# Patient Record
Sex: MALE | Race: WHITE | NOT HISPANIC OR LATINO | Employment: OTHER | URBAN - METROPOLITAN AREA
[De-identification: names, ages, dates, MRNs, and addresses within clinical notes are randomized per-mention and may not be internally consistent; named-entity substitution may affect disease eponyms.]

---

## 2017-04-26 ENCOUNTER — TRANSCRIBE ORDERS (OUTPATIENT)
Dept: ADMINISTRATIVE | Facility: HOSPITAL | Age: 72
End: 2017-04-26

## 2017-04-26 DIAGNOSIS — Z87.891 FORMER SMOKER: Primary | ICD-10-CM

## 2017-05-03 ENCOUNTER — HOSPITAL ENCOUNTER (OUTPATIENT)
Dept: CT IMAGING | Facility: HOSPITAL | Age: 72
Discharge: HOME | End: 2017-05-03
Admitting: PHYSICIAN ASSISTANT

## 2017-05-03 DIAGNOSIS — Z87.891 FORMER SMOKER: ICD-10-CM

## 2017-05-03 PROCEDURE — G0297 LDCT FOR LUNG CA SCREEN: HCPCS

## 2018-06-13 ENCOUNTER — TRANSCRIBE ORDERS (OUTPATIENT)
Dept: ULTRASOUND IMAGING | Facility: HOSPITAL | Age: 73
End: 2018-06-13

## 2018-06-13 DIAGNOSIS — G89.29 CHRONIC LOW BACK PAIN WITHOUT SCIATICA, UNSPECIFIED BACK PAIN LATERALITY: Primary | ICD-10-CM

## 2018-06-13 DIAGNOSIS — M54.50 CHRONIC LOW BACK PAIN WITHOUT SCIATICA, UNSPECIFIED BACK PAIN LATERALITY: Primary | ICD-10-CM

## 2018-06-14 ENCOUNTER — APPOINTMENT (OUTPATIENT)
Dept: CT IMAGING | Facility: HOSPITAL | Age: 73
End: 2018-06-14

## 2018-06-14 ENCOUNTER — HOSPITAL ENCOUNTER (EMERGENCY)
Facility: HOSPITAL | Age: 73
Discharge: HOME | End: 2018-06-14
Attending: EMERGENCY MEDICINE | Admitting: EMERGENCY MEDICINE

## 2018-06-14 VITALS
RESPIRATION RATE: 16 BRPM | SYSTOLIC BLOOD PRESSURE: 133 MMHG | TEMPERATURE: 97.8 F | HEART RATE: 59 BPM | DIASTOLIC BLOOD PRESSURE: 78 MMHG | OXYGEN SATURATION: 92 % | HEIGHT: 73 IN

## 2018-06-14 DIAGNOSIS — M54.50 ACUTE RIGHT-SIDED LOW BACK PAIN WITHOUT SCIATICA: Primary | ICD-10-CM

## 2018-06-14 LAB
ALBUMIN SERPL-MCNC: 4.5 G/DL (ref 3.5–5)
ALBUMIN/GLOB SERPL: 1.4 G/DL (ref 1–2)
ALP SERPL-CCNC: 86 U/L (ref 38–126)
ALT SERPL W P-5'-P-CCNC: 32 U/L (ref 13–69)
ANION GAP SERPL CALCULATED.3IONS-SCNC: 14.1 MMOL/L (ref 10–20)
AST SERPL-CCNC: 38 U/L (ref 15–46)
BASOPHILS # BLD AUTO: 0.03 10*3/MM3 (ref 0–0.2)
BASOPHILS NFR BLD AUTO: 0.4 % (ref 0–2.5)
BILIRUB SERPL-MCNC: 0.5 MG/DL (ref 0.2–1.3)
BILIRUB UR QL STRIP: NEGATIVE
BUN BLD-MCNC: 14 MG/DL (ref 7–20)
BUN/CREAT SERPL: 12.7 (ref 6.3–21.9)
CALCIUM SPEC-SCNC: 9.4 MG/DL (ref 8.4–10.2)
CHLORIDE SERPL-SCNC: 101 MMOL/L (ref 98–107)
CLARITY UR: CLEAR
CO2 SERPL-SCNC: 28 MMOL/L (ref 26–30)
COLOR UR: NORMAL
CREAT BLD-MCNC: 1.1 MG/DL (ref 0.6–1.3)
DEPRECATED RDW RBC AUTO: 42.5 FL (ref 37–54)
EOSINOPHIL # BLD AUTO: 0.09 10*3/MM3 (ref 0–0.7)
EOSINOPHIL NFR BLD AUTO: 1.3 % (ref 0–7)
ERYTHROCYTE [DISTWIDTH] IN BLOOD BY AUTOMATED COUNT: 12.3 % (ref 11.5–14.5)
GFR SERPL CREATININE-BSD FRML MDRD: 66 ML/MIN/1.73
GLOBULIN UR ELPH-MCNC: 3.3 GM/DL
GLUCOSE BLD-MCNC: 105 MG/DL (ref 74–98)
GLUCOSE UR STRIP-MCNC: NEGATIVE MG/DL
HCT VFR BLD AUTO: 43.5 % (ref 42–52)
HGB BLD-MCNC: 14.8 G/DL (ref 14–18)
HGB UR QL STRIP.AUTO: NEGATIVE
IMM GRANULOCYTES # BLD: 0.02 10*3/MM3 (ref 0–0.06)
IMM GRANULOCYTES NFR BLD: 0.3 % (ref 0–0.6)
KETONES UR QL STRIP: NEGATIVE
LEUKOCYTE ESTERASE UR QL STRIP.AUTO: NEGATIVE
LYMPHOCYTES # BLD AUTO: 1.83 10*3/MM3 (ref 0.6–3.4)
LYMPHOCYTES NFR BLD AUTO: 26 % (ref 10–50)
MCH RBC QN AUTO: 32.5 PG (ref 27–31)
MCHC RBC AUTO-ENTMCNC: 34 G/DL (ref 30–37)
MCV RBC AUTO: 95.6 FL (ref 80–94)
MONOCYTES # BLD AUTO: 0.53 10*3/MM3 (ref 0–0.9)
MONOCYTES NFR BLD AUTO: 7.5 % (ref 0–12)
NEUTROPHILS # BLD AUTO: 4.55 10*3/MM3 (ref 2–6.9)
NEUTROPHILS NFR BLD AUTO: 64.5 % (ref 37–80)
NITRITE UR QL STRIP: NEGATIVE
NRBC BLD MANUAL-RTO: 0 /100 WBC (ref 0–0)
PH UR STRIP.AUTO: 6 [PH] (ref 5–8)
PLATELET # BLD AUTO: 161 10*3/MM3 (ref 130–400)
PMV BLD AUTO: 9.8 FL (ref 6–12)
POTASSIUM BLD-SCNC: 4.1 MMOL/L (ref 3.5–5.1)
PROT SERPL-MCNC: 7.8 G/DL (ref 6.3–8.2)
PROT UR QL STRIP: NEGATIVE
RBC # BLD AUTO: 4.55 10*6/MM3 (ref 4.7–6.1)
SODIUM BLD-SCNC: 139 MMOL/L (ref 137–145)
SP GR UR STRIP: <=1.005 (ref 1–1.03)
UROBILINOGEN UR QL STRIP: NORMAL
WBC NRBC COR # BLD: 7.05 10*3/MM3 (ref 4.8–10.8)

## 2018-06-14 PROCEDURE — 25010000002 IOPAMIDOL 61 % SOLUTION: Performed by: EMERGENCY MEDICINE

## 2018-06-14 PROCEDURE — 81003 URINALYSIS AUTO W/O SCOPE: CPT | Performed by: PHYSICIAN ASSISTANT

## 2018-06-14 PROCEDURE — 85025 COMPLETE CBC W/AUTO DIFF WBC: CPT | Performed by: PHYSICIAN ASSISTANT

## 2018-06-14 PROCEDURE — 99284 EMERGENCY DEPT VISIT MOD MDM: CPT

## 2018-06-14 PROCEDURE — 74177 CT ABD & PELVIS W/CONTRAST: CPT

## 2018-06-14 PROCEDURE — 80053 COMPREHEN METABOLIC PANEL: CPT | Performed by: PHYSICIAN ASSISTANT

## 2018-06-14 PROCEDURE — 96360 HYDRATION IV INFUSION INIT: CPT

## 2018-06-14 RX ORDER — NAPROXEN 500 MG/1
500 TABLET ORAL ONCE
Status: COMPLETED | OUTPATIENT
Start: 2018-06-14 | End: 2018-06-14

## 2018-06-14 RX ORDER — NAPROXEN 250 MG/1
250 TABLET ORAL 2 TIMES DAILY PRN
Qty: 14 TABLET | Refills: 0 | Status: SHIPPED | OUTPATIENT
Start: 2018-06-14 | End: 2022-01-07

## 2018-06-14 RX ADMIN — IOPAMIDOL 100 ML: 612 INJECTION, SOLUTION INTRAVENOUS at 20:41

## 2018-06-14 RX ADMIN — NAPROXEN 500 MG: 500 TABLET ORAL at 21:58

## 2018-06-14 RX ADMIN — SODIUM CHLORIDE 1000 ML: 9 INJECTION, SOLUTION INTRAVENOUS at 20:05

## 2018-06-15 ENCOUNTER — HOSPITAL ENCOUNTER (OUTPATIENT)
Dept: ULTRASOUND IMAGING | Facility: HOSPITAL | Age: 73
Discharge: HOME | End: 2018-06-15
Admitting: INTERNAL MEDICINE

## 2018-06-15 DIAGNOSIS — M54.50 CHRONIC LOW BACK PAIN WITHOUT SCIATICA, UNSPECIFIED BACK PAIN LATERALITY: ICD-10-CM

## 2018-06-15 DIAGNOSIS — G89.29 CHRONIC LOW BACK PAIN WITHOUT SCIATICA, UNSPECIFIED BACK PAIN LATERALITY: ICD-10-CM

## 2018-06-15 PROCEDURE — 76775 US EXAM ABDO BACK WALL LIM: CPT

## 2018-06-15 NOTE — ED PROVIDER NOTES
Subjective   72-year-old male presents to emergency department with right-sided low back pain.  He has chronic low back pain.  He states that using her to left side.  He was seen by his primary care physician earlier this week and started almost relaxers, he is scheduled to get an ultrasound of his abdomen according to the patient.  He has had chronic pain and he was noted to his arthritis.  The pain is worse with movement especially bending.  No urinary symptoms.  No chest pain or shortness of breath no fever chills.            Review of Systems   Musculoskeletal: Positive for back pain.   All other systems reviewed and are negative.      Past Medical History:   Diagnosis Date   • Cancer     Prostate   • Diabetes mellitus    • Hypertension        No Known Allergies    Past Surgical History:   Procedure Laterality Date   • CARDIAC SURGERY     • PROSTATE SURGERY         History reviewed. No pertinent family history.    Social History     Social History   • Marital status:      Social History Main Topics   • Smoking status: Former Smoker   • Alcohol use No   • Drug use: No     Other Topics Concern   • Not on file           Objective   Physical Exam   Constitutional: He is oriented to person, place, and time. He appears well-developed and well-nourished.   HENT:   Head: Normocephalic and atraumatic.   Eyes: EOM are normal. Pupils are equal, round, and reactive to light.   Neck: Normal range of motion.   Cardiovascular: Normal rate and regular rhythm.    Pulmonary/Chest: Effort normal and breath sounds normal.   Abdominal: Soft. Bowel sounds are normal.   Musculoskeletal: He exhibits tenderness.   Neurological: He is alert and oriented to person, place, and time.   Skin: Skin is warm and dry.   Psychiatric: He has a normal mood and affect. His behavior is normal. Judgment and thought content normal.   Nursing note and vitals reviewed.      Procedures           ED Course                  MDM      Final diagnoses:    Acute right-sided low back pain without sciatica            Terrell Ruano Jr., PA-C  06/14/18 [id number]

## 2019-07-21 ENCOUNTER — HOSPITAL ENCOUNTER (EMERGENCY)
Facility: HOSPITAL | Age: 74
Discharge: HOME | End: 2019-07-22
Attending: STUDENT IN AN ORGANIZED HEALTH CARE EDUCATION/TRAINING PROGRAM | Admitting: STUDENT IN AN ORGANIZED HEALTH CARE EDUCATION/TRAINING PROGRAM

## 2019-07-21 ENCOUNTER — APPOINTMENT (OUTPATIENT)
Dept: GENERAL RADIOLOGY | Facility: HOSPITAL | Age: 74
End: 2019-07-21

## 2019-07-21 DIAGNOSIS — R07.9 CHEST PAIN, UNSPECIFIED TYPE: Primary | ICD-10-CM

## 2019-07-21 LAB
ALBUMIN SERPL-MCNC: 4.7 G/DL (ref 3.5–5)
ALBUMIN/GLOB SERPL: 1.4 G/DL (ref 1–2)
ALP SERPL-CCNC: 87 U/L (ref 38–126)
ALT SERPL W P-5'-P-CCNC: 40 U/L (ref 13–69)
ANION GAP SERPL CALCULATED.3IONS-SCNC: 15.7 MMOL/L (ref 10–20)
AST SERPL-CCNC: 38 U/L (ref 15–46)
BASOPHILS # BLD AUTO: 0.04 10*3/MM3 (ref 0–0.2)
BASOPHILS NFR BLD AUTO: 0.6 % (ref 0–1.5)
BILIRUB SERPL-MCNC: 0.6 MG/DL (ref 0.2–1.3)
BUN BLD-MCNC: 13 MG/DL (ref 7–20)
BUN/CREAT SERPL: 10 (ref 6.3–21.9)
CALCIUM SPEC-SCNC: 9.2 MG/DL (ref 8.4–10.2)
CHLORIDE SERPL-SCNC: 101 MMOL/L (ref 98–107)
CO2 SERPL-SCNC: 28 MMOL/L (ref 26–30)
CREAT BLD-MCNC: 1.3 MG/DL (ref 0.6–1.3)
DEPRECATED RDW RBC AUTO: 43.4 FL (ref 37–54)
EOSINOPHIL # BLD AUTO: 0.09 10*3/MM3 (ref 0–0.4)
EOSINOPHIL NFR BLD AUTO: 1.3 % (ref 0.3–6.2)
ERYTHROCYTE [DISTWIDTH] IN BLOOD BY AUTOMATED COUNT: 12.6 % (ref 12.3–15.4)
GFR SERPL CREATININE-BSD FRML MDRD: 54 ML/MIN/1.73
GLOBULIN UR ELPH-MCNC: 3.4 GM/DL
GLUCOSE BLD-MCNC: 92 MG/DL (ref 74–98)
HCT VFR BLD AUTO: 45.5 % (ref 37.5–51)
HGB BLD-MCNC: 15.2 G/DL (ref 13–17.7)
HOLD SPECIMEN: NORMAL
IMM GRANULOCYTES # BLD AUTO: 0.03 10*3/MM3 (ref 0–0.05)
IMM GRANULOCYTES NFR BLD AUTO: 0.4 % (ref 0–0.5)
LYMPHOCYTES # BLD AUTO: 2.12 10*3/MM3 (ref 0.7–3.1)
LYMPHOCYTES NFR BLD AUTO: 30.3 % (ref 19.6–45.3)
MCH RBC QN AUTO: 31.5 PG (ref 26.6–33)
MCHC RBC AUTO-ENTMCNC: 33.4 G/DL (ref 31.5–35.7)
MCV RBC AUTO: 94.2 FL (ref 79–97)
MONOCYTES # BLD AUTO: 0.57 10*3/MM3 (ref 0.1–0.9)
MONOCYTES NFR BLD AUTO: 8.1 % (ref 5–12)
NEUTROPHILS # BLD AUTO: 4.15 10*3/MM3 (ref 1.7–7)
NEUTROPHILS NFR BLD AUTO: 59.3 % (ref 42.7–76)
NRBC BLD AUTO-RTO: 0 /100 WBC (ref 0–0.2)
PLATELET # BLD AUTO: 206 10*3/MM3 (ref 140–450)
PMV BLD AUTO: 9.7 FL (ref 6–12)
POTASSIUM BLD-SCNC: 3.7 MMOL/L (ref 3.5–5.1)
PROT SERPL-MCNC: 8.1 G/DL (ref 6.3–8.2)
RBC # BLD AUTO: 4.83 10*6/MM3 (ref 4.14–5.8)
SODIUM BLD-SCNC: 141 MMOL/L (ref 137–145)
TROPONIN I SERPL-MCNC: 0.01 NG/ML (ref 0–0.05)
TROPONIN I SERPL-MCNC: <0.012 NG/ML (ref 0–0.03)
TROPONIN I SERPL-MCNC: <0.012 NG/ML (ref 0–0.03)
WBC NRBC COR # BLD: 7 10*3/MM3 (ref 3.4–10.8)
WHOLE BLOOD HOLD SPECIMEN: NORMAL
WHOLE BLOOD HOLD SPECIMEN: NORMAL

## 2019-07-21 PROCEDURE — 71045 X-RAY EXAM CHEST 1 VIEW: CPT

## 2019-07-21 PROCEDURE — 84484 ASSAY OF TROPONIN QUANT: CPT | Performed by: STUDENT IN AN ORGANIZED HEALTH CARE EDUCATION/TRAINING PROGRAM

## 2019-07-21 PROCEDURE — 84484 ASSAY OF TROPONIN QUANT: CPT | Performed by: PHYSICIAN ASSISTANT

## 2019-07-21 PROCEDURE — 93005 ELECTROCARDIOGRAM TRACING: CPT | Performed by: STUDENT IN AN ORGANIZED HEALTH CARE EDUCATION/TRAINING PROGRAM

## 2019-07-21 PROCEDURE — 85025 COMPLETE CBC W/AUTO DIFF WBC: CPT | Performed by: STUDENT IN AN ORGANIZED HEALTH CARE EDUCATION/TRAINING PROGRAM

## 2019-07-21 PROCEDURE — 80053 COMPREHEN METABOLIC PANEL: CPT | Performed by: STUDENT IN AN ORGANIZED HEALTH CARE EDUCATION/TRAINING PROGRAM

## 2019-07-21 PROCEDURE — 99284 EMERGENCY DEPT VISIT MOD MDM: CPT

## 2019-07-21 RX ORDER — SODIUM CHLORIDE 0.9 % (FLUSH) 0.9 %
10 SYRINGE (ML) INJECTION AS NEEDED
Status: DISCONTINUED | OUTPATIENT
Start: 2019-07-21 | End: 2019-07-22 | Stop reason: HOSPADM

## 2019-07-22 VITALS
RESPIRATION RATE: 20 BRPM | BODY MASS INDEX: 30.45 KG/M2 | DIASTOLIC BLOOD PRESSURE: 55 MMHG | WEIGHT: 230.8 LBS | SYSTOLIC BLOOD PRESSURE: 120 MMHG | TEMPERATURE: 97.3 F | HEART RATE: 51 BPM | OXYGEN SATURATION: 91 %

## 2021-12-03 ENCOUNTER — OFFICE VISIT (OUTPATIENT)
Dept: UROLOGY | Facility: CLINIC | Age: 76
End: 2021-12-03

## 2021-12-03 ENCOUNTER — PATIENT ROUNDING (BHMG ONLY) (OUTPATIENT)
Dept: UROLOGY | Facility: CLINIC | Age: 76
End: 2021-12-03

## 2021-12-03 VITALS
TEMPERATURE: 98 F | BODY MASS INDEX: 30.34 KG/M2 | SYSTOLIC BLOOD PRESSURE: 132 MMHG | HEART RATE: 81 BPM | WEIGHT: 224 LBS | OXYGEN SATURATION: 97 % | RESPIRATION RATE: 16 BRPM | DIASTOLIC BLOOD PRESSURE: 79 MMHG | HEIGHT: 72 IN

## 2021-12-03 DIAGNOSIS — C61 PROSTATE CANCER (HCC): ICD-10-CM

## 2021-12-03 DIAGNOSIS — R39.9 LOWER URINARY TRACT SYMPTOMS (LUTS): Primary | ICD-10-CM

## 2021-12-03 LAB
BILIRUB BLD-MCNC: NEGATIVE MG/DL
CLARITY, POC: CLEAR
COLOR UR: YELLOW
EXPIRATION DATE: NORMAL
GLUCOSE UR STRIP-MCNC: NEGATIVE MG/DL
KETONES UR QL: NEGATIVE
LEUKOCYTE EST, POC: NEGATIVE
Lab: NORMAL
NITRITE UR-MCNC: NEGATIVE MG/ML
PH UR: 6 [PH] (ref 5–8)
PROT UR STRIP-MCNC: NEGATIVE MG/DL
RBC # UR STRIP: NEGATIVE /UL
SP GR UR: 1.01 (ref 1–1.03)
UROBILINOGEN UR QL: NORMAL

## 2021-12-03 PROCEDURE — 51798 US URINE CAPACITY MEASURE: CPT | Performed by: UROLOGY

## 2021-12-03 PROCEDURE — 81003 URINALYSIS AUTO W/O SCOPE: CPT | Performed by: UROLOGY

## 2021-12-03 PROCEDURE — 99204 OFFICE O/P NEW MOD 45 MIN: CPT | Performed by: UROLOGY

## 2021-12-03 RX ORDER — AMOXICILLIN 875 MG/1
TABLET, COATED ORAL EVERY 12 HOURS
COMMUNITY
End: 2022-01-07

## 2021-12-03 RX ORDER — NIACIN 500 MG
TABLET ORAL
COMMUNITY
Start: 2021-11-18

## 2021-12-03 RX ORDER — OXYBUTYNIN CHLORIDE 15 MG/1
TABLET, EXTENDED RELEASE ORAL
COMMUNITY
End: 2021-12-03

## 2021-12-03 RX ORDER — TAMSULOSIN HYDROCHLORIDE 0.4 MG/1
CAPSULE ORAL
COMMUNITY

## 2021-12-03 NOTE — PROGRESS NOTES
Chief Complaint  LUTS    Referring Provider  Rachele Gay, DO    HPI  Mr. Kong is a 76 y.o. male with history of Prostate Cancer who presents with LUTS. Hx of of perineal prostatectomy by Juliette in 1999.   Primary symptom includes: urgency, UUI  Patient denies dysuria, hesitancy, sensation of incomplete bladder emptying, suprapubic pain or weak urinary stream.  Onset was years ago.    Previous treatments include: ditropan 15    IPSS Questionnaire (AUA-7):  Over the past month…    1)  Incomplete Emptying  How often have you had a sensation of not emptying your bladder?  1 - Less than 1 time in 5   2)  Frequency  How often have you had to urinate less than every two hours? 3 - About half the time   3)  Intermittency  How often have you found you stopped and started again several times when you urinated?  3 - About half the time   4) Urgency  How often have you found it difficult to postpone urination?  4 - More than half the time   5) Weak Stream  How often have you had a weak urinary stream?  1 - Less than 1 time in 5   6) Straining  How often have you had to push or strain to begin urination?  0 - Not at all   7) Nocturia  How many times did you typically get up at night to urinate?  2 - 2 times   Total Score:  14       Quality of life due to urinary symptoms:  If you were to spend the rest of your life with your urinary condition the way it is now, how would you feel about that? 1-Pleased   Urine Leakage (Incontinence) 0-No Leakage     Denies memory problems    Past Medical History  Past Medical History:   Diagnosis Date   • Cancer (HCC)     Prostate   • Diabetes mellitus (HCC)    • Hx of CABG    • Hypertension        Past Surgical History  Past Surgical History:   Procedure Laterality Date   • CARDIAC SURGERY     • PROSTATE SURGERY         Medications    Current Outpatient Medications:   •  amLODIPine (NORVASC) 5 MG tablet, amlodipine 5 mg tablet  Take 1 tablet every day by oral route in the evening for  90 days., Disp: , Rfl:   •  Aspirin Buf,CaCarb-MgCarb-MgO, 81 MG tablet, aspirin 81 mg tablet  Daily, Disp: , Rfl:   •  cyclobenzaprine (FLEXERIL) 10 MG tablet, cyclobenzaprine 10 mg tablet  TAKE 1 TABLET EVERY DAY, Disp: , Rfl:   •  ibuprofen (ADVIL,MOTRIN) 400 MG tablet, Every 4 (Four) Hours., Disp: , Rfl:   •  levothyroxine (SYNTHROID, LEVOTHROID) 125 MCG tablet, levothyroxine 125 mcg tablet  Take 1 tablet every day by oral route for 90 days., Disp: , Rfl:   •  loratadine (CLARITIN) 10 MG tablet, loratadine 10 mg tablet  TAKE 1 TABLET EVERY DAY AS NEEDED, Disp: , Rfl:   •  montelukast (SINGULAIR) 10 MG tablet, montelukast 10 mg tablet  TAKE 1 TABLET EVERY DAY  For allergies, Disp: , Rfl:   •  naproxen (NAPROSYN) 250 MG tablet, Take 1 tablet by mouth 2 (Two) Times a Day As Needed for Mild Pain ., Disp: 14 tablet, Rfl: 0  •  niacin 500 MG tablet, , Disp: , Rfl:   •  oxybutynin XL (DITROPAN XL) 15 MG 24 hr tablet, oxybutynin chloride ER 15 mg tablet,extended release 24 hr  Take 1 tablet every day by oral route for 90 days., Disp: , Rfl:   •  pravastatin (PRAVACHOL) 80 MG tablet, pravastatin 80 mg tablet  TAKE 1 TABLET EVERY DAY, Disp: , Rfl:   •  tamsulosin (FLOMAX) 0.4 MG capsule 24 hr capsule, tamsulosin 0.4 mg capsule  Take 1 capsule every day by oral route for 90 days., Disp: , Rfl:   •  tiZANidine (ZANAFLEX) 4 MG tablet, Every 6 (Six) Hours., Disp: , Rfl:   •  albuterol sulfate HFA (Proventil HFA) 108 (90 Base) MCG/ACT inhaler, Proventil HFA 90 mcg/actuation aerosol inhaler  2 puffs Every six hours as needed, Disp: , Rfl:   •  amoxicillin (AMOXIL) 875 MG tablet, Every 12 (Twelve) Hours., Disp: , Rfl:   •  baclofen (LIORESAL) 10 MG tablet, Every 8 (Eight) Hours., Disp: , Rfl:   •  fluticasone (FLONASE) 50 MCG/ACT nasal spray, fluticasone propionate 50 mcg/actuation nasal spray,suspension  2 sprays eavh nostril Daily  For alergies, Disp: , Rfl:   •  methylPREDNISolone (MEDROL) 4 MG dose pack, Take as directed on  "package instructions., Disp: 1 each, Rfl: 0  •  nitroglycerin (NITROSTAT) 0.4 MG SL tablet, nitroglycerin 0.4 mg sublingual tablet  Place 1 tablet as needed by sublingual route., Disp: , Rfl:     Allergies  No Known Allergies    Social History  Social History     Socioeconomic History   • Marital status:    Tobacco Use   • Smoking status: Former Smoker   • Smokeless tobacco: Never Used   Vaping Use   • Vaping Use: Never used   Substance and Sexual Activity   • Alcohol use: No   • Drug use: No   • Sexual activity: Never       Family History  He has no family history of prostate cancer  History reviewed. No pertinent family history.    Physical Exam  Visit Vitals  /79   Pulse 81   Temp 98 °F (36.7 °C) (Temporal)   Resp 16   Ht 182.9 cm (72\")   Wt 102 kg (224 lb)   SpO2 97%   BMI 30.38 kg/m²     Physical exam notable for mild obesity.    Labs  No results found for: PSA    Brief Urine Lab Results     None        PSA from Dr. Gay 6/25/21 - 0.02 ng/dL. OS records reviewed.     PVR  Post-void residual performed by staff - 0 mL      Assessment  Mr. Kong is a 76 y.o. male who presents with worsening chronic LUTS, primarily UUI. Hx of open perineal prostatectomy in 1999 for PCa, risk group unclear. No reported adjuvant therapy. PSA undetectable last summer. No reported TATI.    Plan  1.  Follow up for cystoscopy, TRUS, Uroflow, FÉLIX, GE  2.  PSA today  3.  Try switching ditropan to myrbetriq due to age and risk of dementia  4.  FU tele in 3 weeks to discuss PSA and Rx change efficacy      José Miguel Kong MD    "

## 2021-12-03 NOTE — PROGRESS NOTES
December 3, 2021    Hello, may I speak with Geovanny Kong?    My name is Selam Varghese    I am  with [facility]  [address]  [address]  [facility] [address] [phone].    Before we get started may I verify your date of birth? 1945    I am calling to officially welcome you to our practice and ask about your recent visit. Is this a good time to talk? Yes    Tell me about your visit with us. What things went well?  Things went good,  was a nice man and really addressed my concerns and the little nurse was real nice.       We're always looking for ways to make our patients' experiences even better. Do you have recommendations on ways we may improve?  no ma'am, I think ya'll are doing a fine job.    Overall were you satisfied with your first visit to our practice? Yes ma'am       I appreciate you taking the time to speak with me today. Is there anything else I can do for you? No, thank you for calling.      Thank you, and have a great day.

## 2021-12-04 LAB — PSA SERPL-MCNC: 0.02 NG/ML (ref 0–4)

## 2021-12-29 ENCOUNTER — TELEPHONE (OUTPATIENT)
Dept: UROLOGY | Facility: CLINIC | Age: 76
End: 2021-12-29

## 2021-12-29 NOTE — TELEPHONE ENCOUNTER
Provider: DR WANG    Caller: MAYELA WANG    Relationship to Patient: WIFE    Phone Number: [phone]    Reason for Call: PT HAD DIFFICULTY HEARING WIFE WANTS TO VERIFY COMMUNICATION    When was the patient last seen: 12/28/21

## 2022-01-07 PROCEDURE — U0004 COV-19 TEST NON-CDC HGH THRU: HCPCS | Performed by: NURSE PRACTITIONER

## 2023-10-18 ENCOUNTER — OFFICE VISIT (OUTPATIENT)
Dept: PULMONOLOGY | Facility: CLINIC | Age: 78
End: 2023-10-18
Payer: MEDICARE

## 2023-10-18 VITALS
SYSTOLIC BLOOD PRESSURE: 130 MMHG | RESPIRATION RATE: 18 BRPM | HEIGHT: 73 IN | WEIGHT: 197 LBS | DIASTOLIC BLOOD PRESSURE: 72 MMHG | HEART RATE: 58 BPM | BODY MASS INDEX: 26.11 KG/M2 | OXYGEN SATURATION: 94 %

## 2023-10-18 DIAGNOSIS — Z87.891 PERSONAL HISTORY OF NICOTINE DEPENDENCE: ICD-10-CM

## 2023-10-18 DIAGNOSIS — R93.89 ABNORMAL CT OF THE CHEST: ICD-10-CM

## 2023-10-18 DIAGNOSIS — J44.9 CHRONIC OBSTRUCTIVE PULMONARY DISEASE, UNSPECIFIED COPD TYPE: ICD-10-CM

## 2023-10-18 DIAGNOSIS — R06.02 SHORTNESS OF BREATH: Primary | ICD-10-CM

## 2023-10-18 PROCEDURE — 99204 OFFICE O/P NEW MOD 45 MIN: CPT | Performed by: INTERNAL MEDICINE

## 2023-10-18 RX ORDER — ALBUTEROL SULFATE 90 UG/1
2 AEROSOL, METERED RESPIRATORY (INHALATION) EVERY 4 HOURS PRN
Qty: 18 G | Refills: 5 | Status: SHIPPED | OUTPATIENT
Start: 2023-10-18

## 2023-10-18 NOTE — PROGRESS NOTES
"  CONSULT NOTE    Requested by:   Rachele Gay DO Kendrick, Casandra R, DO      Chief Complaint   Patient presents with    Breathing Problem    Consult       Subjective:  Geovanny Kong is a 78 y.o. male.   Patient comes in today for evaluation of shortness of breath. Patient says that he was diagnosed with COPD by another pulmonologist many years ago.    The patient says that before the diagnosis he was having shortness of breath for the past few years and was noticing that his exercise tolerance was declining. The patient has had days when walking any significant distance is difficult to do without stopping in the middle, to catch his breath.     Patient used to smoke 2 packs per day for the past 30 years and quit smoking in 2005.     he is currently not on oxygen.     The following portions of the patient's history were reviewed and updated as appropriate: allergies, current medications, past family history, past medical history, past social history, and past surgical history.    Review of Systems   HENT:  Negative for sinus pressure, sneezing and sore throat.    Respiratory:  Negative for cough, chest tightness, shortness of breath and wheezing.    Cardiovascular:  Negative for palpitations and leg swelling.   Psychiatric/Behavioral:  Negative for sleep disturbance.    All other systems reviewed and are negative.      Past Medical History:   Diagnosis Date    Cancer     Prostate    Diabetes mellitus     Hx of CABG     Hypertension        Social History     Tobacco Use    Smoking status: Former    Smokeless tobacco: Never   Substance Use Topics    Alcohol use: No         Objective:  Visit Vitals  /72   Pulse 58   Resp 18   Ht 185.4 cm (73\") Comment: pt reported   Wt 89.4 kg (197 lb)   SpO2 94%   BMI 25.99 kg/m²       Physical Exam  Vitals reviewed.   Constitutional:       Appearance: He is well-developed.   HENT:      Mouth/Throat:      Mouth: Mucous membranes are moist.      Comments: Dentures " noted  Neck:      Vascular: No JVD.   Cardiovascular:      Rate and Rhythm: Normal rate and regular rhythm.   Pulmonary:      Effort: Pulmonary effort is normal.      Breath sounds: Wheezing present. No rales.      Comments: Somewhat hyperresonant to percussion.  Somewhat decreased air entry.  Mild scattered wheezing noted.   Musculoskeletal:      Cervical back: Neck supple.      Right lower leg: No edema.      Left lower leg: No edema.      Comments: Gait was slow.   Skin:     General: Skin is warm and dry.   Neurological:      Mental Status: He is alert and oriented to person, place, and time.   Psychiatric:         Mood and Affect: Mood normal.         Behavior: Behavior normal.           Assessment/Plan:  Diagnoses and all orders for this visit:    1. Shortness of breath (Primary)  -     Complete PFT - Pre & Post Bronchodilator; Future    2. Chronic obstructive pulmonary disease, unspecified COPD type  -     Complete PFT - Pre & Post Bronchodilator; Future    3. Personal history of nicotine dependence  Comments:  Quit in 2005    4. Abnormal CT of the chest  Comments:  No need for further w/u. Last CT in 2017    Other orders  -     albuterol sulfate HFA (Ventolin HFA) 108 (90 Base) MCG/ACT inhaler; Inhale 2 puffs Every 4 (Four) Hours As Needed for Wheezing or Shortness of Air.  Dispense: 18 g; Refill: 5  -     tiotropium bromide-olodaterol (STIOLTO RESPIMAT) 2.5-2.5 MCG/ACT aerosol solution inhaler; Inhale 2 puffs Daily.  Dispense: 1 each; Refill: 5        Return in about 7 months (around 5/16/2024) for Recheck, PFT F/U, For [place] ([place]).    DISCUSSION(if any):  Last CT scan results was reviewed in great detail with the patient.  Results for orders placed during the hospital encounter of 05/03/17    CT Chest Low Dose Wo    Narrative  EXAMINATION: CT CHEST, LOW DOSE WITHOUT CONTRAST-05/03/2017:    INDICATION: Former smoker; Z87.891-Personal history of nicotine  dependence.    TECHNIQUE:  Low dose noncontrast  screening technique was used.    The radiation dose reduction device was turned on for each scan per the  ALARA (As Low as Reasonably Achievable) protocol.    COMPARISON: NONE.    FINDINGS:  1. There is a spiculated small nodule left upper lung which is worrisome  because of the spiculation. It is only 8 x 9 mm.  2. There is marked diffuse centrilobular emphysema and obstructive lung  disease. There is mild diffuse perimeter fibrosis.  3. Mild calcified granulomas are seen in the chest. There is mild four  chamber cardiomegaly without pericardial effusion and there is no  evidence of bulky central adenopathy.    Impression  1.  Worrisome immediately subcentimeter spiculated nodule left upper  lobe near the left apex measuring 8 x 9 mm.  2.  Background pattern of severe centrilobular emphysema, obstructive  disease and mild hazy groundglass interstitial lung disease.  3.  Cardiomegaly without pericardial effusion.  4.  Pleural effusion or central adenopathy not identified.  5.  Lung RADS Category 4A, followup in 3 months recommended, consider  PET/CT data set of the left upper lobe nodule, although the size of the  nodule is borderline for PET accuracy.    D:  05/03/2017  E:  05/03/2017        This report was finalized on 5/3/2017 2:44 PM by Dr. Geronimo Valle MD.      I have also reviewed his primary care provider's last note that mentions malnutrition and dementia with behavioral disturbance.  It also mentions COPD.     ===========================  ===========================    PFTs were ordered for follow up visit.     Laboratory workup also showed   Lab Results   Component Value Date    HGB 15.2 07/21/2019    HGB 14.8 06/14/2018    HGB 13.1 (L) 08/01/2015   ,   Lab Results   Component Value Date    HCT 45.5 07/21/2019    HCT 43.5 06/14/2018    HCT 40 (L) 08/01/2015       Lab Results   Component Value Date    EOSABS 0.09 07/21/2019    EOSABS 0.09 06/14/2018    EOSABS 0.16 08/01/2015    & Laboratory workup also  showed   Lab Results   Component Value Date    CO2 28.0 07/21/2019     ===========================  ===========================    I have asked my office staff to obtain records from previous pulmonologist so they can be reviewed to ascertain the stage of COPD and to review last PFTs/imaging studies etc.    Orders as above.    I told the patient that based on history and physical exam, he does appear to have COPD, as the most likely cause for his symptoms.     Patient was educated on compliance with, and the correct method of using the pulmonary medicines.     Side effects, of prescribed medicines, discussed.    I have told him that we will made further recommendations, based on clinical response.     In reviewing the patient primary care provider's last office note, he was following with another pulmonologist for the lung nodule and as of April 2019, no further follow-up for lung nodule was recommended by his previous pulmonologist as well.    Since his last CT was more than 5 years ago, no further work up is needed.     Patient was given reading material, as indicated.    The patient says that he is up-to-date with pneumonia vaccinations.     It was recommended that the patient consider Prevnar-20 vaccination and discuss it with his PCP.     The patient was reminded to stay up-to-date with influenza vaccinations. he will discuss this with his PCP.       Dictated utilizing Dragon dictation.    This document was electronically signed by Adis Gasca MD on 10/18/23 at 10:40 EDT

## 2024-05-14 ENCOUNTER — OFFICE VISIT (OUTPATIENT)
Dept: PULMONOLOGY | Facility: CLINIC | Age: 79
End: 2024-05-14
Payer: MEDICARE

## 2024-05-14 VITALS
RESPIRATION RATE: 14 BRPM | HEART RATE: 76 BPM | OXYGEN SATURATION: 94 % | WEIGHT: 209 LBS | DIASTOLIC BLOOD PRESSURE: 66 MMHG | SYSTOLIC BLOOD PRESSURE: 134 MMHG | BODY MASS INDEX: 27.7 KG/M2 | HEIGHT: 73 IN

## 2024-05-14 DIAGNOSIS — G47.34 NOCTURNAL HYPOXIA: ICD-10-CM

## 2024-05-14 DIAGNOSIS — R06.02 SHORTNESS OF BREATH: ICD-10-CM

## 2024-05-14 DIAGNOSIS — R09.02 EXERCISE HYPOXEMIA: ICD-10-CM

## 2024-05-14 DIAGNOSIS — J44.9 CHRONIC OBSTRUCTIVE PULMONARY DISEASE, UNSPECIFIED COPD TYPE: Primary | ICD-10-CM

## 2024-05-14 DIAGNOSIS — J44.9 CHRONIC OBSTRUCTIVE PULMONARY DISEASE, UNSPECIFIED COPD TYPE: ICD-10-CM

## 2024-05-14 DIAGNOSIS — Z87.891 PERSONAL HISTORY OF NICOTINE DEPENDENCE: ICD-10-CM

## 2024-05-14 PROCEDURE — 99214 OFFICE O/P EST MOD 30 MIN: CPT | Performed by: NURSE PRACTITIONER

## 2024-05-14 PROCEDURE — 94726 PLETHYSMOGRAPHY LUNG VOLUMES: CPT | Performed by: INTERNAL MEDICINE

## 2024-05-14 PROCEDURE — 94618 PULMONARY STRESS TESTING: CPT | Performed by: NURSE PRACTITIONER

## 2024-05-14 PROCEDURE — 94729 DIFFUSING CAPACITY: CPT | Performed by: INTERNAL MEDICINE

## 2024-05-14 PROCEDURE — 94060 EVALUATION OF WHEEZING: CPT | Performed by: INTERNAL MEDICINE

## 2024-05-14 RX ORDER — ALBUTEROL SULFATE 90 UG/1
2 AEROSOL, METERED RESPIRATORY (INHALATION) EVERY 4 HOURS PRN
Qty: 18 G | Refills: 5 | Status: SHIPPED | OUTPATIENT
Start: 2024-05-14

## 2024-05-14 RX ORDER — MONTELUKAST SODIUM 10 MG/1
10 TABLET ORAL NIGHTLY
Qty: 90 TABLET | Refills: 1 | Status: SHIPPED | OUTPATIENT
Start: 2024-05-14

## 2024-05-14 NOTE — PROGRESS NOTES
"Chief Complaint   Patient presents with    Shortness of Breath    Follow-up         Subjective   Geovanny Kong is a 78 y.o. male.   Patient comes today for follow up of shortness of breath and COPD.     Symptoms have been stable since the last clinic visit. Patient reports no recent exacerbations.     Patient is using medications, as prescribed. He uses Stiolto most days and ALEX 2-3 times a week.     He denies any cough.     He takes singulair nightly and claritin daily.     He uses oxygen at night.     Exercise tolerance has also remained stable.     Quit smoking over 30 years ago.       The following portions of the patient's history were reviewed and updated as appropriate: allergies, current medications, past family history, past medical history, past social history, and past surgical history.    Review of Systems   HENT:  Negative for sinus pressure, sneezing and sore throat.    Respiratory:  Negative for cough, chest tightness, shortness of breath and wheezing.        Objective   Visit Vitals  /66   Pulse 76   Resp 14   Ht 185.4 cm (73\") Comment: pt reported   Wt 94.8 kg (209 lb)   SpO2 94%   BMI 27.57 kg/m²     ============================  ============================    6 MINUTE WALK TEST    Geovanny Kong   1945             BASELINE   SpO2%: 94 % RA    Heart Rate 76   Blood Pressure 134/66     EXERCISE SpO2% HEART RATE RA or O2 @ LPM   1 MINUTE 88 72 RA ADD 2LPM   2 MINUTES 90 57 2LPM   3 MINUTES 91 78 2LPM   4 MINUTES 93 90 2LPM   5 MINUTES 97 95 2LPM   6 MINUTES 96 73 2LPM   (Number of laps: 6 X 36 meters + Final partial lap:  meters = 216 meters)            Distance Walked:  216 Meters   SpO2% Post Exercise:  94 % ON 2LPM   HR Post Exercise:  76     Reason to stop (if applicable):   ____ Chest Pain   ____ Light Headedness   ____ Dyspnea Unrelieved by Rest   ____ Abnormal Gait Pattern   ____ Severe Fatigue   ____ Other (Specify: __________________________)    Tech Comments (if any):  "     Test performed by: YOLANDA    ============================  ============================      Physical Exam  Vitals reviewed.   HENT:      Head: Atraumatic.      Ears:      Comments: [place]  Eyes:      Extraocular Movements: Extraocular movements intact.   Cardiovascular:      Rate and Rhythm: Normal rate and regular rhythm.   Pulmonary:      Effort: Pulmonary effort is normal. No respiratory distress.   Musculoskeletal:      Cervical back: Neck supple.   Skin:     General: Skin is warm.   Neurological:      Mental Status: He is alert and oriented to person, place, and time.           Assessment & Plan   Diagnoses and all orders for this visit:    1. Chronic obstructive pulmonary disease, unspecified COPD type (Primary)    2. Personal history of nicotine dependence    3. Shortness of breath  -     Converted Six Minute Walk    4. Exercise hypoxemia    5. Nocturnal hypoxia    Other orders  -     albuterol sulfate HFA (Ventolin HFA) 108 (90 Base) MCG/ACT inhaler; Inhale 2 puffs Every 4 (Four) Hours As Needed for Wheezing or Shortness of Air.  Dispense: 18 g; Refill: 5  -     montelukast (SINGULAIR) 10 MG tablet; Take 1 tablet by mouth Every Night.  Dispense: 90 tablet; Refill: 1  -     tiotropium bromide-olodaterol (STIOLTO RESPIMAT) 2.5-2.5 MCG/ACT aerosol solution inhaler; Inhale 2 puffs Daily.  Dispense: 1 each; Refill: 5           Return in about 7 months (around 12/14/2024) for Recheck, For Dr. Gasca.    DISCUSSION (if any):  PFT today consistent with moderate obstruction. Restriction noted with air trapping suggested. Decreased diffusion capacity.     On 6-minute walk, the patient needed 2 L of oxygen.  When asked him if he would be willing to use portable oxygen with activity, he tells me he has a portable concentrator at home.  I asked him why he is not using it and he states he does not know.  I have asked him to use his portable oxygen concentrator when he is going out to doctors appointments and walking any  distance.  He verbalizes understanding.    He should continue using oxygen at night also.    No change to the current medications has been made. He feels he is doing well overall. I have asked him to use Stiolto daily and continue ALEX as needed.     Compliance with medications stressed.     Side effects of prescribed medications discussed with the patient    He does not meet guidelines for low-dose CT screening.    Dictated utilizing Dragon dictation.    This document was electronically signed by ABDIRASHID Warren May 14, 2024  11:25 EDT

## 2024-09-10 ENCOUNTER — HOSPITAL ENCOUNTER (EMERGENCY)
Facility: HOSPITAL | Age: 79
Discharge: HOME | End: 2024-09-10
Attending: EMERGENCY MEDICINE | Admitting: EMERGENCY MEDICINE
Payer: COMMERCIAL

## 2024-09-10 ENCOUNTER — APPOINTMENT (OUTPATIENT)
Dept: CT IMAGING | Facility: HOSPITAL | Age: 79
End: 2024-09-10
Payer: COMMERCIAL

## 2024-09-10 VITALS
HEART RATE: 87 BPM | RESPIRATION RATE: 18 BRPM | HEIGHT: 73 IN | TEMPERATURE: 97.6 F | BODY MASS INDEX: 29.82 KG/M2 | DIASTOLIC BLOOD PRESSURE: 66 MMHG | OXYGEN SATURATION: 93 % | WEIGHT: 225 LBS | SYSTOLIC BLOOD PRESSURE: 111 MMHG
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
## 2024-09-10 DIAGNOSIS — R10.9 LEFT FLANK PAIN: Primary | ICD-10-CM

## 2024-09-10 LAB
ALBUMIN SERPL-MCNC: 4.2 G/DL (ref 3.5–5.2)
ALBUMIN/GLOB SERPL: 1.7 G/DL
ALP SERPL-CCNC: 106 U/L (ref 39–117)
ALT SERPL W P-5'-P-CCNC: 19 U/L (ref 1–41)
ANION GAP SERPL CALCULATED.3IONS-SCNC: 9.5 MMOL/L (ref 5–15)
AST SERPL-CCNC: 26 U/L (ref 1–40)
BASOPHILS # BLD AUTO: 0.01 10*3/MM3 (ref 0–0.2)
BASOPHILS NFR BLD AUTO: 0.2 % (ref 0–1.5)
BILIRUB SERPL-MCNC: 0.5 MG/DL (ref 0–1.2)
BUN SERPL-MCNC: 14 MG/DL (ref 8–23)
BUN/CREAT SERPL: 10.6 (ref 7–25)
CALCIUM SPEC-SCNC: 9 MG/DL (ref 8.6–10.5)
CHLORIDE SERPL-SCNC: 103 MMOL/L (ref 98–107)
CO2 SERPL-SCNC: 24.5 MMOL/L (ref 22–29)
CREAT SERPL-MCNC: 1.32 MG/DL (ref 0.76–1.27)
DEPRECATED RDW RBC AUTO: 44.3 FL (ref 37–54)
EGFRCR SERPLBLD CKD-EPI 2021: 54.5 ML/MIN/1.73
EOSINOPHIL # BLD AUTO: 0.06 10*3/MM3 (ref 0–0.4)
EOSINOPHIL NFR BLD AUTO: 1 % (ref 0.3–6.2)
ERYTHROCYTE [DISTWIDTH] IN BLOOD BY AUTOMATED COUNT: 12.8 % (ref 12.3–15.4)
GLOBULIN UR ELPH-MCNC: 2.5 GM/DL
GLUCOSE SERPL-MCNC: 101 MG/DL (ref 65–99)
HCT VFR BLD AUTO: 39.2 % (ref 37.5–51)
HGB BLD-MCNC: 13.6 G/DL (ref 13–17.7)
IMM GRANULOCYTES # BLD AUTO: 0.02 10*3/MM3 (ref 0–0.05)
IMM GRANULOCYTES NFR BLD AUTO: 0.3 % (ref 0–0.5)
LIPASE SERPL-CCNC: 15 U/L (ref 13–60)
LYMPHOCYTES # BLD AUTO: 1.55 10*3/MM3 (ref 0.7–3.1)
LYMPHOCYTES NFR BLD AUTO: 25.5 % (ref 19.6–45.3)
MCH RBC QN AUTO: 32.7 PG (ref 26.6–33)
MCHC RBC AUTO-ENTMCNC: 34.7 G/DL (ref 31.5–35.7)
MCV RBC AUTO: 94.2 FL (ref 79–97)
MONOCYTES # BLD AUTO: 0.49 10*3/MM3 (ref 0.1–0.9)
MONOCYTES NFR BLD AUTO: 8 % (ref 5–12)
NEUTROPHILS NFR BLD AUTO: 3.96 10*3/MM3 (ref 1.7–7)
NEUTROPHILS NFR BLD AUTO: 65 % (ref 42.7–76)
NRBC BLD AUTO-RTO: 0 /100 WBC (ref 0–0.2)
PLATELET # BLD AUTO: 171 10*3/MM3 (ref 140–450)
PMV BLD AUTO: 10 FL (ref 6–12)
POTASSIUM SERPL-SCNC: 3.9 MMOL/L (ref 3.5–5.2)
PROT SERPL-MCNC: 6.7 G/DL (ref 6–8.5)
RBC # BLD AUTO: 4.16 10*6/MM3 (ref 4.14–5.8)
SODIUM SERPL-SCNC: 137 MMOL/L (ref 136–145)
WBC NRBC COR # BLD AUTO: 6.09 10*3/MM3 (ref 3.4–10.8)

## 2024-09-10 PROCEDURE — 71260 CT THORAX DX C+: CPT

## 2024-09-10 PROCEDURE — 25510000001 IOPAMIDOL 61 % SOLUTION: Performed by: EMERGENCY MEDICINE

## 2024-09-10 PROCEDURE — 80053 COMPREHEN METABOLIC PANEL: CPT | Performed by: EMERGENCY MEDICINE

## 2024-09-10 PROCEDURE — 85025 COMPLETE CBC W/AUTO DIFF WBC: CPT | Performed by: EMERGENCY MEDICINE

## 2024-09-10 PROCEDURE — 96374 THER/PROPH/DIAG INJ IV PUSH: CPT

## 2024-09-10 PROCEDURE — 99285 EMERGENCY DEPT VISIT HI MDM: CPT

## 2024-09-10 PROCEDURE — 83690 ASSAY OF LIPASE: CPT | Performed by: EMERGENCY MEDICINE

## 2024-09-10 PROCEDURE — 25010000002 MORPHINE PER 10 MG: Performed by: EMERGENCY MEDICINE

## 2024-09-10 PROCEDURE — 74177 CT ABD & PELVIS W/CONTRAST: CPT

## 2024-09-10 RX ORDER — MORPHINE SULFATE 2 MG/ML
2 INJECTION, SOLUTION INTRAMUSCULAR; INTRAVENOUS ONCE
Status: COMPLETED | OUTPATIENT
Start: 2024-09-10 | End: 2024-09-10

## 2024-09-10 RX ORDER — IOPAMIDOL 612 MG/ML
100 INJECTION, SOLUTION INTRAVASCULAR
Status: COMPLETED | OUTPATIENT
Start: 2024-09-10 | End: 2024-09-10

## 2024-09-10 RX ADMIN — IOPAMIDOL 100 ML: 612 INJECTION, SOLUTION INTRAVENOUS at 17:59

## 2024-09-10 RX ADMIN — MORPHINE SULFATE 2 MG: 2 INJECTION, SOLUTION INTRAMUSCULAR; INTRAVENOUS at 17:05
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
## 2024-09-11 NOTE — DISCHARGE INSTRUCTIONS
If you notice any concerning symptoms, please return to the ER immediately. These can include but are not limited to: worsening of you condition, fevers, chills, shortness of breath, vomiting, weakness of the extremities, changes in your mental status, numbness, pale extremities, or chest pain.     Take medications as prescribed, your pharmacist may have additional recommendations concerning these medications.    For pain use ibuprofen (Motrin) or acetaminophen (Tylenol), unless prescribed medications that also contain these medications.  You can take over the counter acetaminophen or ibuprofen, please follow the directions as dosages differ. Do not take ibuprofen if you have a history of peptic ulcers, kidney disease, bariatric surgery, or are currently pregnant.  Do not take Tylenol if you have a history of liver disease or alcohol use disorder.        THANK YOU!!! From [facility] Emergency Department    On behalf of the Emergency Department staff at [facility], I would like to thank you for giving us the opportunity to address your health care needs and concerns. We hope that during your visit, our service was delivered in a professional and caring manner. Please keep [facility] in mind as we walk with you down the path to your own personal wellness. Please expect follow-up phone calls concerning additional care and questions about your experience.      You have received additional information specific to your diagnosis in these discharge instructions, please read these fully.  Anytime you have been seen in the emergency department we recommend close follow up with your primary care provider or specialist, please follow these directions as indicated.      Ct scan:Large simple attenuation 8.2 x 3.8 cm fluid collection in the left posterior pararenal regionwith possible connection to a small left renal midpole cyst. Differential includes a largeexophytic cyst versus urinoma from  ruptured cyst.    Low concern this is from trauma, fever worsening flank pain he did need to be evaluated as this could be from traumatic injury.

## 2024-10-18 ENCOUNTER — APPOINTMENT (OUTPATIENT)
Dept: CT IMAGING | Facility: HOSPITAL | Age: 79
DRG: 322 | End: 2024-10-18
Payer: MEDICARE

## 2024-10-18 ENCOUNTER — APPOINTMENT (OUTPATIENT)
Dept: GENERAL RADIOLOGY | Facility: HOSPITAL | Age: 79
DRG: 322 | End: 2024-10-18
Payer: MEDICARE

## 2024-10-18 ENCOUNTER — HOSPITAL ENCOUNTER (INPATIENT)
Facility: HOSPITAL | Age: 79
LOS: 2 days | Discharge: HOME | DRG: 322 | End: 2024-10-20
Attending: EMERGENCY MEDICINE | Admitting: INTERNAL MEDICINE
Payer: MEDICARE

## 2024-10-18 DIAGNOSIS — I21.11 ST ELEVATION MYOCARDIAL INFARCTION INVOLVING RIGHT CORONARY ARTERY: Primary | ICD-10-CM

## 2024-10-18 LAB
ALBUMIN SERPL-MCNC: 4.3 G/DL (ref 3.5–5.2)
ALBUMIN/GLOB SERPL: 1.5 G/DL
ALP SERPL-CCNC: 121 U/L (ref 39–117)
ALT SERPL W P-5'-P-CCNC: 16 U/L (ref 1–41)
ANION GAP SERPL CALCULATED.3IONS-SCNC: 12.5 MMOL/L (ref 5–15)
AST SERPL-CCNC: 21 U/L (ref 1–40)
BASOPHILS # BLD AUTO: 0.04 10*3/MM3 (ref 0–0.2)
BASOPHILS NFR BLD AUTO: 0.6 % (ref 0–1.5)
BILIRUB SERPL-MCNC: 0.6 MG/DL (ref 0–1.2)
BUN SERPL-MCNC: 16 MG/DL (ref 8–23)
BUN/CREAT SERPL: 11.6 (ref 7–25)
CALCIUM SPEC-SCNC: 9.9 MG/DL (ref 8.6–10.5)
CHLORIDE SERPL-SCNC: 101 MMOL/L (ref 98–107)
CO2 SERPL-SCNC: 24.5 MMOL/L (ref 22–29)
CREAT SERPL-MCNC: 1.38 MG/DL (ref 0.76–1.27)
DEPRECATED RDW RBC AUTO: 42.6 FL (ref 37–54)
EGFRCR SERPLBLD CKD-EPI 2021: 51.7 ML/MIN/1.73
EOSINOPHIL # BLD AUTO: 0.1 10*3/MM3 (ref 0–0.4)
EOSINOPHIL NFR BLD AUTO: 1.5 % (ref 0.3–6.2)
ERYTHROCYTE [DISTWIDTH] IN BLOOD BY AUTOMATED COUNT: 12.3 % (ref 12.3–15.4)
GEN 5 2HR TROPONIN T REFLEX: 688 NG/L
GLOBULIN UR ELPH-MCNC: 2.8 GM/DL
GLUCOSE SERPL-MCNC: 162 MG/DL (ref 65–99)
HCT VFR BLD AUTO: 41 % (ref 37.5–51)
HGB BLD-MCNC: 14.3 G/DL (ref 13–17.7)
HOLD SPECIMEN: NORMAL
HOLD SPECIMEN: NORMAL
IMM GRANULOCYTES # BLD AUTO: 0.01 10*3/MM3 (ref 0–0.05)
IMM GRANULOCYTES NFR BLD AUTO: 0.2 % (ref 0–0.5)
INR PPP: 1.04 (ref 0.9–1.1)
LYMPHOCYTES # BLD AUTO: 2.18 10*3/MM3 (ref 0.7–3.1)
LYMPHOCYTES NFR BLD AUTO: 33.6 % (ref 19.6–45.3)
MAGNESIUM SERPL-MCNC: 2.1 MG/DL (ref 1.6–2.4)
MCH RBC QN AUTO: 33 PG (ref 26.6–33)
MCHC RBC AUTO-ENTMCNC: 34.9 G/DL (ref 31.5–35.7)
MCV RBC AUTO: 94.7 FL (ref 79–97)
MONOCYTES # BLD AUTO: 0.46 10*3/MM3 (ref 0.1–0.9)
MONOCYTES NFR BLD AUTO: 7.1 % (ref 5–12)
NEUTROPHILS NFR BLD AUTO: 3.69 10*3/MM3 (ref 1.7–7)
NEUTROPHILS NFR BLD AUTO: 57 % (ref 42.7–76)
NRBC BLD AUTO-RTO: 0 /100 WBC (ref 0–0.2)
PLATELET # BLD AUTO: 170 10*3/MM3 (ref 140–450)
PMV BLD AUTO: 10.1 FL (ref 6–12)
POTASSIUM SERPL-SCNC: 3.6 MMOL/L (ref 3.5–5.2)
PROT SERPL-MCNC: 7.1 G/DL (ref 6–8.5)
PROTHROMBIN TIME: 14.2 SECONDS (ref 12.3–15.1)
RBC # BLD AUTO: 4.33 10*6/MM3 (ref 4.14–5.8)
SODIUM SERPL-SCNC: 138 MMOL/L (ref 136–145)
TROPONIN T DELTA: 672 NG/L
TROPONIN T SERPL HS-MCNC: 16 NG/L
WBC NRBC COR # BLD AUTO: 6.48 10*3/MM3 (ref 3.4–10.8)
WHOLE BLOOD HOLD COAG: NORMAL
WHOLE BLOOD HOLD SPECIMEN: NORMAL

## 2024-10-18 PROCEDURE — 25810000003 SODIUM CHLORIDE 0.9 % SOLUTION: Performed by: INTERNAL MEDICINE

## 2024-10-18 PROCEDURE — C1769 GUIDE WIRE: HCPCS | Performed by: INTERNAL MEDICINE

## 2024-10-18 PROCEDURE — C1894 INTRO/SHEATH, NON-LASER: HCPCS | Performed by: INTERNAL MEDICINE

## 2024-10-18 PROCEDURE — 80053 COMPREHEN METABOLIC PANEL: CPT | Performed by: EMERGENCY MEDICINE

## 2024-10-18 PROCEDURE — 25010000002 BIVALIRUDIN TRIFLUOROACETATE 250 MG RECONSTITUTED SOLUTION 1 EACH VIAL: Performed by: INTERNAL MEDICINE

## 2024-10-18 PROCEDURE — 25010000002 MIDAZOLAM PER 1MG: Performed by: INTERNAL MEDICINE

## 2024-10-18 PROCEDURE — 25510000001 IOPAMIDOL 61 % SOLUTION: Performed by: EMERGENCY MEDICINE

## 2024-10-18 PROCEDURE — 93005 ELECTROCARDIOGRAM TRACING: CPT | Performed by: EMERGENCY MEDICINE

## 2024-10-18 PROCEDURE — 84484 ASSAY OF TROPONIN QUANT: CPT | Performed by: EMERGENCY MEDICINE

## 2024-10-18 PROCEDURE — 25510000001 IOPAMIDOL PER 1 ML: Performed by: INTERNAL MEDICINE

## 2024-10-18 PROCEDURE — 75635 CT ANGIO ABDOMINAL ARTERIES: CPT

## 2024-10-18 PROCEDURE — 027034Z DILATION OF CORONARY ARTERY, ONE ARTERY WITH DRUG-ELUTING INTRALUMINAL DEVICE, PERCUTANEOUS APPROACH: ICD-10-PCS | Performed by: INTERNAL MEDICINE

## 2024-10-18 PROCEDURE — 85025 COMPLETE CBC W/AUTO DIFF WBC: CPT | Performed by: EMERGENCY MEDICINE

## 2024-10-18 PROCEDURE — 4A023N7 MEASUREMENT OF CARDIAC SAMPLING AND PRESSURE, LEFT HEART, PERCUTANEOUS APPROACH: ICD-10-PCS | Performed by: INTERNAL MEDICINE

## 2024-10-18 PROCEDURE — 25010000002 FENTANYL CITRATE (PF) 50 MCG/ML SOLUTION: Performed by: INTERNAL MEDICINE

## 2024-10-18 PROCEDURE — 99153 MOD SED SAME PHYS/QHP EA: CPT | Performed by: INTERNAL MEDICINE

## 2024-10-18 PROCEDURE — 99285 EMERGENCY DEPT VISIT HI MDM: CPT

## 2024-10-18 PROCEDURE — C9606 PERC D-E COR REVASC W AMI S: HCPCS | Performed by: INTERNAL MEDICINE

## 2024-10-18 PROCEDURE — C1874 STENT, COATED/COV W/DEL SYS: HCPCS | Performed by: INTERNAL MEDICINE

## 2024-10-18 PROCEDURE — C1887 CATHETER, GUIDING: HCPCS | Performed by: INTERNAL MEDICINE

## 2024-10-18 PROCEDURE — 25010000002 ONDANSETRON PER 1 MG: Performed by: INTERNAL MEDICINE

## 2024-10-18 PROCEDURE — 71045 X-RAY EXAM CHEST 1 VIEW: CPT

## 2024-10-18 PROCEDURE — 93459 L HRT ART/GRFT ANGIO: CPT | Performed by: INTERNAL MEDICINE

## 2024-10-18 PROCEDURE — 99223 1ST HOSP IP/OBS HIGH 75: CPT | Performed by: STUDENT IN AN ORGANIZED HEALTH CARE EDUCATION/TRAINING PROGRAM

## 2024-10-18 PROCEDURE — 92941 PRQ TRLML REVSC TOT OCCL AMI: CPT | Performed by: INTERNAL MEDICINE

## 2024-10-18 PROCEDURE — 71275 CT ANGIOGRAPHY CHEST: CPT

## 2024-10-18 PROCEDURE — 85610 PROTHROMBIN TIME: CPT | Performed by: EMERGENCY MEDICINE

## 2024-10-18 PROCEDURE — C1725 CATH, TRANSLUMIN NON-LASER: HCPCS | Performed by: INTERNAL MEDICINE

## 2024-10-18 PROCEDURE — 99152 MOD SED SAME PHYS/QHP 5/>YRS: CPT | Performed by: INTERNAL MEDICINE

## 2024-10-18 PROCEDURE — B2111ZZ FLUOROSCOPY OF MULTIPLE CORONARY ARTERIES USING LOW OSMOLAR CONTRAST: ICD-10-PCS | Performed by: INTERNAL MEDICINE

## 2024-10-18 PROCEDURE — 99223 1ST HOSP IP/OBS HIGH 75: CPT | Performed by: INTERNAL MEDICINE

## 2024-10-18 PROCEDURE — 25010000002 LIDOCAINE 1 % SOLUTION: Performed by: INTERNAL MEDICINE

## 2024-10-18 PROCEDURE — 83735 ASSAY OF MAGNESIUM: CPT | Performed by: EMERGENCY MEDICINE

## 2024-10-18 DEVICE — XIENCE SKYPOINT™ EVEROLIMUS ELUTING CORONARY STENT SYSTEM 2.50 MM X 18 MM / RAPID-EXCHANGE
Type: IMPLANTABLE DEVICE | Site: HEART | Status: FUNCTIONAL
Brand: XIENCE SKYPOINT™

## 2024-10-18 RX ORDER — FENTANYL CITRATE 50 UG/ML
INJECTION, SOLUTION INTRAMUSCULAR; INTRAVENOUS
Status: DISCONTINUED | OUTPATIENT
Start: 2024-10-18 | End: 2024-10-18 | Stop reason: HOSPADM

## 2024-10-18 RX ORDER — ALBUTEROL SULFATE 0.83 MG/ML
2.5 SOLUTION RESPIRATORY (INHALATION) EVERY 6 HOURS PRN
Status: DISCONTINUED | OUTPATIENT
Start: 2024-10-18 | End: 2024-10-20 | Stop reason: HOSPADM

## 2024-10-18 RX ORDER — ASPIRIN 81 MG/1
324 TABLET, CHEWABLE ORAL ONCE
Status: COMPLETED | OUTPATIENT
Start: 2024-10-18 | End: 2024-10-18

## 2024-10-18 RX ORDER — ASPIRIN 81 MG/1
TABLET, CHEWABLE ORAL
Status: COMPLETED
Start: 2024-10-18 | End: 2024-10-18

## 2024-10-18 RX ORDER — MIDAZOLAM HYDROCHLORIDE 2 MG/2ML
INJECTION, SOLUTION INTRAMUSCULAR; INTRAVENOUS
Status: DISCONTINUED | OUTPATIENT
Start: 2024-10-18 | End: 2024-10-18 | Stop reason: HOSPADM

## 2024-10-18 RX ORDER — ATORVASTATIN CALCIUM 80 MG/1
80 TABLET, FILM COATED ORAL NIGHTLY
Status: DISCONTINUED | OUTPATIENT
Start: 2024-10-19 | End: 2024-10-20 | Stop reason: HOSPADM

## 2024-10-18 RX ORDER — LEVOTHYROXINE SODIUM 125 UG/1
125 TABLET ORAL
Status: DISCONTINUED | OUTPATIENT
Start: 2024-10-19 | End: 2024-10-20 | Stop reason: HOSPADM

## 2024-10-18 RX ORDER — SODIUM CHLORIDE 9 MG/ML
3 INJECTION, SOLUTION INTRAVENOUS CONTINUOUS
Status: DISCONTINUED | OUTPATIENT
Start: 2024-10-18 | End: 2024-10-19

## 2024-10-18 RX ORDER — IOPAMIDOL 612 MG/ML
100 INJECTION, SOLUTION INTRAVASCULAR
Status: COMPLETED | OUTPATIENT
Start: 2024-10-18 | End: 2024-10-18

## 2024-10-18 RX ORDER — SODIUM CHLORIDE 9 MG/ML
INJECTION, SOLUTION INTRAVENOUS
Status: COMPLETED | OUTPATIENT
Start: 2024-10-18 | End: 2024-10-18

## 2024-10-18 RX ORDER — ASPIRIN 81 MG/1
81 TABLET ORAL DAILY
Status: DISCONTINUED | OUTPATIENT
Start: 2024-10-19 | End: 2024-10-20 | Stop reason: HOSPADM

## 2024-10-18 RX ORDER — NITROGLYCERIN 0.4 MG/1
0.4 TABLET SUBLINGUAL
Status: DISCONTINUED | OUTPATIENT
Start: 2024-10-18 | End: 2024-10-20 | Stop reason: HOSPADM

## 2024-10-18 RX ORDER — ONDANSETRON 2 MG/ML
INJECTION INTRAMUSCULAR; INTRAVENOUS
Status: DISCONTINUED | OUTPATIENT
Start: 2024-10-18 | End: 2024-10-18 | Stop reason: HOSPADM

## 2024-10-18 RX ORDER — ACETAMINOPHEN 325 MG/1
650 TABLET ORAL EVERY 4 HOURS PRN
Status: DISCONTINUED | OUTPATIENT
Start: 2024-10-18 | End: 2024-10-20 | Stop reason: HOSPADM

## 2024-10-18 RX ORDER — BUDESONIDE AND FORMOTEROL FUMARATE DIHYDRATE 160; 4.5 UG/1; UG/1
2 AEROSOL RESPIRATORY (INHALATION)
Status: DISCONTINUED | OUTPATIENT
Start: 2024-10-18 | End: 2024-10-20 | Stop reason: HOSPADM

## 2024-10-18 RX ORDER — LIDOCAINE HYDROCHLORIDE 10 MG/ML
INJECTION, SOLUTION INFILTRATION; PERINEURAL
Status: DISCONTINUED | OUTPATIENT
Start: 2024-10-18 | End: 2024-10-18 | Stop reason: HOSPADM

## 2024-10-18 RX ORDER — SODIUM CHLORIDE 0.9 % (FLUSH) 0.9 %
10 SYRINGE (ML) INJECTION AS NEEDED
Status: DISCONTINUED | OUTPATIENT
Start: 2024-10-18 | End: 2024-10-20 | Stop reason: HOSPADM

## 2024-10-18 RX ORDER — IOPAMIDOL 755 MG/ML
INJECTION, SOLUTION INTRAVASCULAR
Status: DISCONTINUED | OUTPATIENT
Start: 2024-10-18 | End: 2024-10-18 | Stop reason: HOSPADM

## 2024-10-18 RX ORDER — SODIUM CHLORIDE 9 MG/ML
250 INJECTION, SOLUTION INTRAVENOUS ONCE AS NEEDED
Status: DISCONTINUED | OUTPATIENT
Start: 2024-10-18 | End: 2024-10-19

## 2024-10-18 RX ADMIN — ASPIRIN 81 MG 324 MG: 81 TABLET ORAL at 20:10

## 2024-10-18 RX ADMIN — ASPIRIN 324 MG: 81 TABLET, CHEWABLE ORAL at 20:10

## 2024-10-18 RX ADMIN — ACETAMINOPHEN 650 MG: 325 TABLET, FILM COATED ORAL at 22:42

## 2024-10-18 RX ADMIN — IOPAMIDOL 100 ML: 612 INJECTION, SOLUTION INTRAVENOUS at 20:20

## 2024-10-18 RX ADMIN — SODIUM CHLORIDE 3 ML/KG/HR: 9 INJECTION, SOLUTION INTRAVENOUS at 23:06

## 2024-10-18 NOTE — ED PROVIDER NOTES
[facility] INTENSIVE CARE  Emergency Department Encounter  Emergency Medicine Physician Note     Pt Name:Geovanny Kong  MRN: [id number]  Birthdate 1945  Date of evaluation: 10/18/2024  PCP:  Rachele Gay DO  Note Started: 8:00 PM EDT      CHIEF COMPLAINT       Chief Complaint   Patient presents with    Chest Pain    Shortness of Breath       HISTORY OF PRESENT ILLNESS  (Location/Symptom, Timing/Onset, Context/Setting, Quality, Duration, Modifying Factors, Severity.)      Geovanny Kong is a 79 y.o. male who presents with shortness of breath, back pain.  Patient states that he became sweaty and lightheaded suddenly earlier this evening while at dinner at a local restaurant.  Patient describes a history of bypass surgery back in 2020.  Patient describes no chest pain this time, describes more back pain.  Patient with no nausea or vomiting.    PAST MEDICAL / SURGICAL / SOCIAL / FAMILY HISTORY     Past Medical History:   Diagnosis Date    Cancer     Prostate    Diabetes mellitus     Hx of CABG     Hypertension      No additional pertinent       Past Surgical History:   Procedure Laterality Date    CARDIAC SURGERY      PROSTATE SURGERY       No additional pertinent       Social History     Socioeconomic History    Marital status:    Tobacco Use    Smoking status: Former    Smokeless tobacco: Never   Vaping Use    Vaping status: Never Used   Substance and Sexual Activity    Alcohol use: No    Drug use: No    Sexual activity: Never       No family history on file.    Allergies:  Patient has no known allergies.    Home Medications:  Prior to Admission medications    Medication Sig Start Date End Date Taking? Authorizing Provider   albuterol sulfate HFA (Ventolin HFA) 108 (90 Base) MCG/ACT inhaler Inhale 2 puffs Every 4 (Four) Hours As Needed for Wheezing or Shortness of Air. 5/14/24   Keyonna Pan APRN   amLODIPine (NORVASC) 5 MG tablet amlodipine 5 mg tablet   Take 1  tablet every day by oral route in the evening for 90 days.    Emergency, Nurse Nayla RN   Aspirin Buf,CaCarb-MgCarb-MgO, 81 MG tablet aspirin 81 mg tablet   Daily    Emergency, Nurse Epic, RN   cyclobenzaprine (FLEXERIL) 10 MG tablet cyclobenzaprine 10 mg tablet   TAKE 1 TABLET EVERY DAY    Emergency, Nurse Nayla RN   levothyroxine (SYNTHROID, LEVOTHROID) 125 MCG tablet levothyroxine 125 mcg tablet   Take 1 tablet every day by oral route for 90 days.    Emergency, Nurse Nayla RN   loratadine (CLARITIN) 10 MG tablet loratadine 10 mg tablet   TAKE 1 TABLET EVERY DAY AS NEEDED    Emergency, Nurse Epic, RN   montelukast (SINGULAIR) 10 MG tablet Take 1 tablet by mouth Every Night. 5/14/24   Keyonna Pan APRN   niacin 500 MG tablet  11/18/21   Provider, MD Abdullahi   nitroglycerin (NITROSTAT) 0.4 MG SL tablet nitroglycerin 0.4 mg sublingual tablet   Place 1 tablet as needed by sublingual route.  Patient not taking: Reported on 10/18/2023    Emergency, Nurse NICK Winslow   tamsulosin (FLOMAX) 0.4 MG capsule 24 hr capsule tamsulosin 0.4 mg capsule   Take 1 capsule every day by oral route for 90 days.    Provider, Historical, MD   tiotropium bromide-olodaterol (STIOLTO RESPIMAT) 2.5-2.5 MCG/ACT aerosol solution inhaler Inhale 2 puffs Daily. 5/14/24   Keyonna Pan APRN   fexofenadine-pseudoephedrine (Allegra-D Allergy & Congestion)  MG per 12 hr tablet Every 12 (Twelve) Hours.  1/29/21  Emergency, Nurse Epic, RN   fluticasone (FLONASE) 50 MCG/ACT nasal spray fluticasone propionate 50 mcg/actuation nasal spray,suspension   2 sprays eavh nostril Daily   For alergies  1/7/22  Emergency, Nurse Nayla RN   pravastatin (PRAVACHOL) 80 MG tablet pravastatin 80 mg tablet   TAKE 1 TABLET EVERY DAY  1/7/22  Emergency, Nurse NICK Winslow         REVIEW OF SYSTEMS       Review of Systems   Constitutional:  Negative for chills and fever.   Respiratory:  Positive for shortness of breath. Negative for chest  "tightness.    Cardiovascular:  Positive for chest pain.   Musculoskeletal:  Positive for back pain.   Neurological:  Positive for light-headedness. Negative for dizziness.       PHYSICAL EXAM      INITIAL VITALS:   /67   Pulse 78   Temp 97.4 °F (36.3 °C) (Oral)   Resp 14   Ht 185.4 cm (73\")   Wt 90.1 kg (198 lb 10.2 oz)   SpO2 92%   BMI 26.21 kg/m²     Physical Exam  Constitutional:       General: He is in acute distress.      Appearance: Normal appearance. He is toxic-appearing.   HENT:      Head: Normocephalic and atraumatic.   Eyes:      Extraocular Movements: Extraocular movements intact.   Cardiovascular:      Rate and Rhythm: Normal rate and regular rhythm.   Pulmonary:      Effort: Pulmonary effort is normal.   Abdominal:      General: Abdomen is flat.   Musculoskeletal:      Right lower leg: No edema.      Left lower leg: No edema.   Skin:     General: Skin is warm and dry.   Neurological:      General: No focal deficit present.      Mental Status: He is alert and oriented to person, place, and time.   Psychiatric:         Mood and Affect: Mood normal.         Behavior: Behavior normal.           DDX/DIAGNOSTIC RESULTS / EMERGENCY DEPARTMENT COURSE / MDM     Differential Diagnosis included but not limited: STEMI, aortic dissection, ACS    Diagnoses Considered but Do Not Suspect: Low concern pulmonary embolism is possible.  Low concern for severe infectious cause of patient's symptoms including pneumonia.    Decision Rules/Scores utilized: N/A     Tests considered but not ordered and why:  N/A     MIPS: N/A     Code Status Discussion:  Not Discussed    Additional Patient Education Provided: None     Medical Decision Making    Medical Decision Making  Patient presenting with chest pain, more back pain described.  With shortness of breath sudden onset.  Patient with history of cardiac disease, concern for ST elevation in 2 3 and aVF and V4 and V5 on EKG demonstrate concerns for STEMI.  Patient's " case discussed with cardiology.  Patient given aspirin here in the emergency department.  Patient sent over for CT scan that demonstrated no concerns for dissection.  Patient sent to Cath Lab for further management and treatment admitted by cardiology for ST elevation MI.    Problems Addressed:  ST elevation myocardial infarction involving right coronary artery: complicated acute illness or injury    Amount and/or Complexity of Data Reviewed  Labs: ordered.  Radiology: ordered.  ECG/medicine tests: ordered.  Discussion of management or test interpretation with external provider(s): Cardiology for STEMI evaluation, EKG sent to cardiologist.    Risk  OTC drugs.  Prescription drug management.        See ED COURSE for additional MDM statements    EKG    EKG Interpretation    Evaluated and interpreted by emergency department physician    Rhythm: Normal Sinus Rhythm  Rate: Normal  Axis: Veronica  Ectopy: Multiple PVC  Conduction: Normal  ST Segments: ST elevation in V4 and V5 that are greater than 1 mm, borderline ST elevation in 2 3 and aVF, concern for inferior STEMI.  T Waves: Normal  Q Waves: None    Clinical Impression: Normal Sinus Rhythm    Seun Santos DO      All EKG's are interpreted by the Emergency Department Physician who either signs or Co-signs this chart in the absence of a cardiologist.    Additional Scores                   EMERGENCY DEPARTMENT COURSE:    ED Course as of 10/18/24 2332   Fri Oct 18, 2024   2012 Patient paged out as a STEMI alert, patient's case discussed with cardiology for concerns of inferior STEMI with also elevations in V4 and V5.  Patient noted to have a history of quadruple bypass.  Patient's quadruple bypass was then back in 2000 at [facility]. [CR]   2015 Personally evaluated the CT scan for dissection, this is noted to be negative.  Will have patient go to Cath Lab for evaluation of STEMI. [CR]      ED Course User Index  [CR] Seun Santos DO        PROCEDURES:  None Performed   Procedures    DATA FOR LAB AND RADIOLOGY TESTS ORDERED BELOW ARE REVIEWED BY THE ED CLINICIAN:    RADIOLOGY: All x-rays, CT, MRI, and formal ultrasound images (except ED bedside ultrasound) are read by the radiologist, see reports below, unless otherwise noted in MDM or here.  Reports below are reviewed by myself.  XR Chest 1 View   Final Result   No acute disease.                           This report was signed and finalized on 10/18/2024 8:47 PM by Geronimo Morris MD.          CT Angio Abdominal Aorta Bilateral Iliofem Runoff   Final Result   No pulmonary embolism, aortic dissection or other   significant abnormality.                   This study was performed with techniques to keep radiation doses as low   as reasonably achievable (ALARA). Individualized dose reduction   techniques using automated exposure control or adjustment of vA and/or   kV according to the patient size were employed.               CT ABDOMEN AND CT PELVIS,  CTA ABDOMEN, CTA PELVIS AND CTA LOWER   EXTREMITY RUNOFF       HISTORY: As above       COMPARISON: None       TECHNIQUE: Thin section axial CT of the abdomen, pelvis and lower   extremities with IV contrast supplemented with multiplanar   reconstruction under CT Angiogram protocol       FINDINGS:       Non-vascular: The gallbladder, solid abdominal organs and ureters are   unremarkable. A benign-appearing cystic lesion is seen in the left   retroperitoneum posterior to the left kidney. The GI tract shows no   abnormality. The urinary bladder is normal. There is no pelvic or   abdominal ascites, adenopathy, or significant osseous abnormality.           CT ANGIOGRAM ABDOMEN AND PELVIS: The abdominal aorta is unremarkable   with no evidence of dissection. The aortic branches are unremarkable as   are the iliac arteries bilaterally.       CTA RIGHT LOWER EXTREMITY: Unremarkable.       CTA LEFT LOWER EXTREMITY: Unremarkable.       CONCLUSION: No significant  abnormality.           This study was performed with techniques to keep radiation doses as low   as reasonably achievable (ALARA). Individualized dose reduction   techniques using automated exposure control or adjustment of vA and/or   kV according to the patient size were employed.        This report was signed and finalized on 10/18/2024 8:45 PM by Geronimo Morris MD.          CT Angiogram Chest   Final Result   No pulmonary embolism, aortic dissection or other   significant abnormality.                   This study was performed with techniques to keep radiation doses as low   as reasonably achievable (ALARA). Individualized dose reduction   techniques using automated exposure control or adjustment of vA and/or   kV according to the patient size were employed.               CT ABDOMEN AND CT PELVIS,  CTA ABDOMEN, CTA PELVIS AND CTA LOWER   EXTREMITY RUNOFF       HISTORY: As above       COMPARISON: None       TECHNIQUE: Thin section axial CT of the abdomen, pelvis and lower   extremities with IV contrast supplemented with multiplanar   reconstruction under CT Angiogram protocol       FINDINGS:       Non-vascular: The gallbladder, solid abdominal organs and ureters are   unremarkable. A benign-appearing cystic lesion is seen in the left   retroperitoneum posterior to the left kidney. The GI tract shows no   abnormality. The urinary bladder is normal. There is no pelvic or   abdominal ascites, adenopathy, or significant osseous abnormality.           CT ANGIOGRAM ABDOMEN AND PELVIS: The abdominal aorta is unremarkable   with no evidence of dissection. The aortic branches are unremarkable as   are the iliac arteries bilaterally.       CTA RIGHT LOWER EXTREMITY: Unremarkable.       CTA LEFT LOWER EXTREMITY: Unremarkable.       CONCLUSION: No significant abnormality.           This study was performed with techniques to keep radiation doses as low   as reasonably achievable (ALARA). Individualized dose reduction    techniques using automated exposure control or adjustment of vA and/or   kV according to the patient size were employed.        This report was signed and finalized on 10/18/2024 8:45 PM by Geronimo Morris MD.              LABS: Lab orders shown below, the results are reviewed by myself, and all abnormals are listed below.  Labs Reviewed   COMPREHENSIVE METABOLIC PANEL - Abnormal; Notable for the following components:       Result Value    Glucose 162 (*)     Creatinine 1.38 (*)     Alkaline Phosphatase 121 (*)     eGFR 51.7 (*)     All other components within normal limits    Narrative:     GFR Normal >60  Chronic Kidney Disease <60  Kidney Failure <15    The GFR formula is only valid for adults with stable renal function between ages 18 and 70.   TROPONIN - Normal    Narrative:     High Sensitive Troponin T Reference Range:  <14.0 ng/L- Negative Female for AMI  <22.0 ng/L- Negative Male for AMI  >=14 - Abnormal Female indicating possible myocardial injury.  >=22 - Abnormal Male indicating possible myocardial injury.   Clinicians would have to utilize clinical acumen, EKG, Troponin, and serial changes to determine if it is an Acute Myocardial Infarction or myocardial injury due to an underlying chronic condition.        MAGNESIUM - Normal   PROTIME-INR - Normal    Narrative:     Suggested INR therapeutic range for stable oral anticoagulant therapy:    Low Intensity therapy:   1.5-2.0  Moderate Intensity therapy:   2.0-3.0  High Intensity therapy:   2.5-4.0   CBC WITH AUTO DIFFERENTIAL - Normal   RAINBOW DRAW    Narrative:     The following orders were created for panel order Magnet Draw.  Procedure                               Abnormality         Status                     ---------                               -----------         ------                     Green Top (Gel)[[id number]]                                  Final result               Lavender Top[[id number]]                                     Final  "result               Gold Top - SST[[id number]]                                   Final result               Light Blue Top[[id number]]                                   Final result                 Please view results for these tests on the individual orders.   HIGH SENSITIVITIY TROPONIN T 2HR   CBC (NO DIFF)   BASIC METABOLIC PANEL   HEMOGLOBIN A1C   LIPID PANEL   CBC AND DIFFERENTIAL    Narrative:     The following orders were created for panel order CBC & Differential.  Procedure                               Abnormality         Status                     ---------                               -----------         ------                     CBC Auto Differential[[id number]]        Normal              Final result                 Please view results for these tests on the individual orders.   GREEN TOP   LAVENDER TOP   GOLD TOP - SST   LIGHT BLUE TOP       Vitals Reviewed:    Vitals:    10/18/24 2031 10/18/24 2035 10/18/24 2130 10/18/24 2154   BP:  128/81 121/67    BP Location:       Patient Position:       Pulse:  91 78    Resp:  16 14    Temp:    97.4 °F (36.3 °C)   TempSrc:    Oral   SpO2: 97% 95% 92%    Weight:    90.1 kg (198 lb 10.2 oz)   Height:    185.4 cm (73\")       MEDICATIONS GIVEN TO PATIENT THIS ENCOUNTER:  Medications   sodium chloride 0.9 % flush 10 mL ( Intravenous MAR Hold 10/18/24 2029)   nitroglycerin (NITROSTAT) SL tablet 0.4 mg (has no administration in time range)   acetaminophen (TYLENOL) tablet 650 mg (650 mg Oral Given 10/18/24 2242)   atropine sulfate injection 0.5 mg (has no administration in time range)   sodium chloride 0.9 % infusion 250 mL (has no administration in time range)   sodium chloride 0.9 % infusion (3 mL/kg/hr × 104 kg Intravenous New Bag 10/18/24 2306)   ticagrelor (BRILINTA) tablet 90 mg (has no administration in time range)   aspirin EC tablet 81 mg (has no administration in time range)   levothyroxine (SYNTHROID, LEVOTHROID) tablet 125 mcg (has no administration in time " range)   atorvastatin (LIPITOR) tablet 80 mg (has no administration in time range)   albuterol (PROVENTIL) nebulizer solution 0.083% 2.5 mg/3mL (has no administration in time range)   tiotropium (SPIRIVA RESPIMAT) 2.5 mcg/act aerosol solution inhaler (has no administration in time range)   budesonide-formoterol (SYMBICORT) 160-4.5 MCG/ACT inhaler 2 puff (has no administration in time range)   aspirin chewable tablet 324 mg (324 mg Oral Given 10/18/24 2010)   iopamidol (ISOVUE-300) 61 % injection 100 mL (100 mL Intravenous Given 10/18/24 2020)   sodium chloride 0.9 % infusion (100 mL/hr Intravenous New Bag 10/18/24 2030)       CONSULTS:  IP CONSULT TO INTERVENTIONAL CARDIOLOGY  CARDIAC REHAB EVALUATION AND ENROLLMENT  IP CONSULT TO HOSPITALIST    CRITICAL CARE:  There was significant risk of life threatening deterioration of patient's condition requiring my direct management. Critical care time 20 minutes, excluding any documented procedures.    FINAL IMPRESSION      1. ST elevation myocardial infarction involving right coronary artery          DISPOSITION / PLAN     ED Disposition       ED Disposition   Intended Admit    Condition   --    Comment   --               PATIENT REFERRED TO:  No follow-up provider specified.    DISCHARGE MEDICATIONS:     Medication List        ASK your doctor about these medications      albuterol sulfate  (90 Base) MCG/ACT inhaler  Commonly known as: Ventolin HFA  Inhale 2 puffs Every 4 (Four) Hours As Needed for Wheezing or Shortness of Air.     amLODIPine 5 MG tablet  Commonly known as: NORVASC     Aspirin Buf(CaCarb-MgCarb-MgO) 81 MG tablet     cyclobenzaprine 10 MG tablet  Commonly known as: FLEXERIL     levothyroxine 125 MCG tablet  Commonly known as: SYNTHROID, LEVOTHROID     loratadine 10 MG tablet  Commonly known as: CLARITIN     montelukast 10 MG tablet  Commonly known as: SINGULAIR  Take 1 tablet by mouth Every Night.     niacin 500 MG tablet     nitroglycerin 0.4 MG SL  tablet  Commonly known as: NITROSTAT     tamsulosin 0.4 MG capsule 24 hr capsule  Commonly known as: FLOMAX     tiotropium bromide-olodaterol 2.5-2.5 MCG/ACT aerosol solution inhaler  Commonly known as: STIOLTO RESPIMAT  Inhale 2 puffs Daily.              Electronically signed by Seun Santos DO, 10/18/24, 8:00 PM EDT.    Emergency Medicine Physician  [facility]  (Please note that portions of thisnote were completed with a voice recognition program.  Efforts were made to edit the dictations but occasionally words are mis-transcribed.)       Seun Santos DO  10/18/24 [id number]

## 2024-10-18 NOTE — Clinical Note
Allergies reviewed.  H&P note has been confirmed for the patient. Procedural consent has not been signed. Staff has reviewed the patient's labs.  Labs have been reviewed and show abnormalities. Physician is aware of labs.

## 2024-10-19 LAB
ANION GAP SERPL CALCULATED.3IONS-SCNC: 12.4 MMOL/L (ref 5–15)
BUN SERPL-MCNC: 12 MG/DL (ref 8–23)
BUN/CREAT SERPL: 11.9 (ref 7–25)
CALCIUM SPEC-SCNC: 8.9 MG/DL (ref 8.6–10.5)
CHLORIDE SERPL-SCNC: 106 MMOL/L (ref 98–107)
CHOLEST SERPL-MCNC: 144 MG/DL (ref 0–200)
CO2 SERPL-SCNC: 22.6 MMOL/L (ref 22–29)
CREAT SERPL-MCNC: 1.01 MG/DL (ref 0.76–1.27)
DEPRECATED RDW RBC AUTO: 41.6 FL (ref 37–54)
EGFRCR SERPLBLD CKD-EPI 2021: 75.2 ML/MIN/1.73
ERYTHROCYTE [DISTWIDTH] IN BLOOD BY AUTOMATED COUNT: 12.1 % (ref 12.3–15.4)
GLUCOSE SERPL-MCNC: 107 MG/DL (ref 65–99)
HBA1C MFR BLD: 5.5 % (ref 4.8–5.6)
HCT VFR BLD AUTO: 40.4 % (ref 37.5–51)
HDLC SERPL-MCNC: 54 MG/DL (ref 40–60)
HGB BLD-MCNC: 13.5 G/DL (ref 13–17.7)
LDLC SERPL CALC-MCNC: 78 MG/DL (ref 0–100)
LDLC/HDLC SERPL: 1.47 {RATIO}
MCH RBC QN AUTO: 31.5 PG (ref 26.6–33)
MCHC RBC AUTO-ENTMCNC: 33.4 G/DL (ref 31.5–35.7)
MCV RBC AUTO: 94.2 FL (ref 79–97)
PLATELET # BLD AUTO: 166 10*3/MM3 (ref 140–450)
PMV BLD AUTO: 10.2 FL (ref 6–12)
POTASSIUM SERPL-SCNC: 3.5 MMOL/L (ref 3.5–5.2)
RBC # BLD AUTO: 4.29 10*6/MM3 (ref 4.14–5.8)
SODIUM SERPL-SCNC: 141 MMOL/L (ref 136–145)
TRIGL SERPL-MCNC: 54 MG/DL (ref 0–150)
VLDLC SERPL-MCNC: 12 MG/DL (ref 5–40)
WBC NRBC COR # BLD AUTO: 6.75 10*3/MM3 (ref 3.4–10.8)

## 2024-10-19 PROCEDURE — 80048 BASIC METABOLIC PNL TOTAL CA: CPT | Performed by: INTERNAL MEDICINE

## 2024-10-19 PROCEDURE — 80061 LIPID PANEL: CPT | Performed by: INTERNAL MEDICINE

## 2024-10-19 PROCEDURE — 94799 UNLISTED PULMONARY SVC/PX: CPT

## 2024-10-19 PROCEDURE — 83036 HEMOGLOBIN GLYCOSYLATED A1C: CPT | Performed by: INTERNAL MEDICINE

## 2024-10-19 PROCEDURE — 99233 SBSQ HOSP IP/OBS HIGH 50: CPT | Performed by: INTERNAL MEDICINE

## 2024-10-19 PROCEDURE — 94640 AIRWAY INHALATION TREATMENT: CPT

## 2024-10-19 PROCEDURE — 94761 N-INVAS EAR/PLS OXIMETRY MLT: CPT

## 2024-10-19 PROCEDURE — 94664 DEMO&/EVAL PT USE INHALER: CPT

## 2024-10-19 PROCEDURE — 85027 COMPLETE CBC AUTOMATED: CPT | Performed by: INTERNAL MEDICINE

## 2024-10-19 RX ORDER — HYDROCODONE BITARTRATE AND ACETAMINOPHEN 5; 325 MG/1; MG/1
1 TABLET ORAL EVERY 6 HOURS PRN
Status: DISCONTINUED | OUTPATIENT
Start: 2024-10-19 | End: 2024-10-20 | Stop reason: HOSPADM

## 2024-10-19 RX ORDER — METOPROLOL SUCCINATE 25 MG/1
25 TABLET, EXTENDED RELEASE ORAL
Status: DISCONTINUED | OUTPATIENT
Start: 2024-10-19 | End: 2024-10-20 | Stop reason: HOSPADM

## 2024-10-19 RX ADMIN — Medication 10 ML: at 20:16

## 2024-10-19 RX ADMIN — METOPROLOL SUCCINATE 25 MG: 25 TABLET, EXTENDED RELEASE ORAL at 11:32

## 2024-10-19 RX ADMIN — ATORVASTATIN CALCIUM 80 MG: 80 TABLET, FILM COATED ORAL at 20:16

## 2024-10-19 RX ADMIN — TICAGRELOR 90 MG: 90 TABLET ORAL at 20:16

## 2024-10-19 RX ADMIN — MUPIROCIN 1 APPLICATION: 20 OINTMENT TOPICAL at 20:16

## 2024-10-19 RX ADMIN — TICAGRELOR 90 MG: 90 TABLET ORAL at 08:36

## 2024-10-19 RX ADMIN — TIOTROPIUM BROMIDE INHALATION SPRAY 2 PUFF: 3.12 SPRAY, METERED RESPIRATORY (INHALATION) at 07:21

## 2024-10-19 RX ADMIN — BUDESONIDE AND FORMOTEROL FUMARATE DIHYDRATE 2 PUFF: 160; 4.5 AEROSOL RESPIRATORY (INHALATION) at 07:21

## 2024-10-19 RX ADMIN — ASPIRIN 81 MG: 81 TABLET, COATED ORAL at 08:36

## 2024-10-19 RX ADMIN — HYDROCODONE BITARTRATE AND ACETAMINOPHEN 1 TABLET: 5; 325 TABLET ORAL at 01:22

## 2024-10-19 RX ADMIN — LEVOTHYROXINE SODIUM 125 MCG: 125 TABLET ORAL at 06:37

## 2024-10-19 RX ADMIN — MUPIROCIN 1 APPLICATION: 20 OINTMENT TOPICAL at 10:51

## 2024-10-19 NOTE — PLAN OF CARE
Goal Outcome Evaluation:  Plan of Care Reviewed With: patient        Progress: improving  Outcome Evaluation: Ten beat runs of V=tach through the night.  Pt non symptomatic with ectopee .  Sheath removed without difficulty.  Pt had some back pain due to lying supine.

## 2024-10-19 NOTE — H&P
[facility] Cardiology History and Physical    Geovanny Kong  1945  [id number]  10/18/24     Chief Complaint: Back pain and shortness of breath    History of Present Illness:   Mr. Geovanny Kong is a 79 y.o. male who is being seen by Cardiology for evaluation of back pain and shortness of breath.  The patient has a past medical history significant for prior tobacco use, COPD, hypertension, stage III chronic kidney disease, and hypothyroidism.  He has a past cardiac history significant for multivessel coronary artery disease with remote CABG in 2000.  He presented to the [facility] for evaluation of acute onset mid back pain and shortness of breath.  Upon evaluation in the emergency department, he was found to have ST elevation in the inferior and lateral leads.  Given his reported back pain, he was taken for an emergent CTA which ruled out an aortic dissection.  A code STEMI was activated, and he was taken to the Cath Lab for emergent coronary angiography with online PCI as indicated.        Review of Systems:   Review of Systems   Constitutional:  Negative for activity change, appetite change, chills, diaphoresis, fatigue, fever, unexpected weight gain and unexpected weight loss.   Eyes:  Negative for blurred vision and double vision.   Respiratory:  Positive for shortness of breath. Negative for cough, chest tightness and wheezing.    Cardiovascular:  Negative for chest pain, palpitations and leg swelling.   Gastrointestinal:  Negative for abdominal pain, anal bleeding, blood in stool and GERD.   Endocrine: Negative for cold intolerance and heat intolerance.   Genitourinary:  Negative for hematuria.   Musculoskeletal:  Positive for back pain.   Neurological:  Negative for dizziness, syncope, weakness and light-headedness.   Hematological:  Does not bruise/bleed easily.   Psychiatric/Behavioral:  Negative for depressed mood and stress. The patient is not  nervous/anxious.        Past Medical History:   Past Medical History:   Diagnosis Date    Cancer     Prostate    Diabetes mellitus     Hx of CABG     Hypertension        Past Surgical History:   Past Surgical History:   Procedure Laterality Date    CARDIAC SURGERY      PROSTATE SURGERY         Family History: No family history on file.    Social History:   Social History     Socioeconomic History    Marital status:    Tobacco Use    Smoking status: Former    Smokeless tobacco: Never   Vaping Use    Vaping status: Never Used   Substance and Sexual Activity    Alcohol use: No    Drug use: No    Sexual activity: Never       Medications:     Current Facility-Administered Medications:     acetaminophen (TYLENOL) tablet 650 mg, 650 mg, Oral, Q4H PRN, Lavon Otero MD    [START ON 10/19/2024] aspirin EC tablet 81 mg, 81 mg, Oral, Daily, Lavon Otero MD    [START ON 10/19/2024] atorvastatin (LIPITOR) tablet 80 mg, 80 mg, Oral, Nightly, Lavon Otero MD    atropine sulfate injection 0.5 mg, 0.5 mg, Intravenous, Q5 Min PRN, Lavon Otero MD    fentaNYL citrate (PF) (SUBLIMAZE) injection, , , Code / Trauma / Sedation Medication, Lavon Otero MD, 25 mcg at 10/18/24 2039    iopamidol (ISOVUE-370) 76 % injection, , , Code / Trauma / Sedation Medication, Lavon Otero MD, 185 mL at 10/18/24 2129    [START ON 10/19/2024] levothyroxine (SYNTHROID, LEVOTHROID) tablet 125 mcg, 125 mcg, Oral, Q AM, Laovn Otero MD    lidocaine (XYLOCAINE) 1 % injection, , , Code / Trauma / Sedation Medication, Lavon Otero MD, 3 mL at 10/18/24 2041    lidocaine (XYLOCAINE) 1 % injection, , , Code / Trauma / Sedation Medication, Lavon Otero MD, 2 mL at 10/18/24 2127    Midazolam HCl (PF) (VERSED) injection, , , Code / Trauma / Sedation Medication, Lavon Otero MD, 1 mg at 10/18/24 2039    nitroglycerin (NITROSTAT) SL tablet 0.4 mg, 0.4 mg, Sublingual, Q5 Min PRN, Lavon Otero MD     "ondansetron (ZOFRAN) injection, , , Code / Trauma / Sedation Medication, Lavon Otero MD, 4 mg at 10/18/24 2117    [Transfer Hold] sodium chloride 0.9 % flush 10 mL, 10 mL, Intravenous, PRN, Seun Santos,     sodium chloride 0.9 % infusion 250 mL, 250 mL, Intravenous, Once PRN, Lavon Otero MD    sodium chloride 0.9 % infusion, , , Code / Trauma / Sedation Continuous Med, Lavon Otero MD, Last Rate: 100 mL/hr at 10/18/24 2030, 100 mL/hr at 10/18/24 2030    sodium chloride 0.9 % infusion, 3 mL/kg/hr, Intravenous, Continuous, Lavon Otero MD    [START ON 10/19/2024] ticagrelor (BRILINTA) tablet 90 mg, 90 mg, Oral, BID, Lavon Otero MD    ticagrelor (BRILINTA) tablet, , , Code / Trauma / Sedation Medication, Lavon Otero MD, 180 mg at 10/18/24 2135    Allergies:   No Known Allergies    Physical Exam:  Vital Signs:   Vitals:    10/18/24 1953 10/18/24 2031 10/18/24 2035 10/18/24 2130   BP: 135/91  128/81 121/67   BP Location: Left arm      Patient Position: Lying      Pulse: 76  91 78   Resp: 20  16 14   Temp: 97.6 °F (36.4 °C)      TempSrc: Oral      SpO2: 97% 97% 95% 92%   Weight: 104 kg (230 lb)      Height: 185.4 cm (73\")          Physical Exam  Vitals and nursing note reviewed.   Constitutional:       General: He is not in acute distress.     Appearance: Normal appearance. He is not diaphoretic.   HENT:      Head: Normocephalic and atraumatic.   Cardiovascular:      Rate and Rhythm: Normal rate and regular rhythm.      Heart sounds: No murmur heard.  Pulmonary:      Effort: Pulmonary effort is normal. No respiratory distress.      Breath sounds: Normal breath sounds. No stridor. No wheezing, rhonchi or rales.   Abdominal:      General: Bowel sounds are normal. There is no distension.      Palpations: Abdomen is soft.      Tenderness: There is no abdominal tenderness. There is no guarding or rebound.   Musculoskeletal:         General: No swelling. Normal range of " motion.      Cervical back: Neck supple. No tenderness.   Skin:     General: Skin is warm and dry.   Neurological:      General: No focal deficit present.      Mental Status: He is alert and oriented to person, place, and time.   Psychiatric:         Mood and Affect: Mood normal.         Behavior: Behavior normal.         Results Review:   Results from last 7 days   Lab Units 10/18/24  2002   SODIUM mmol/L 138   POTASSIUM mmol/L 3.6   CHLORIDE mmol/L 101   CO2 mmol/L 24.5   BUN mg/dL 16   CREATININE mg/dL 1.38*   CALCIUM mg/dL 9.9   BILIRUBIN mg/dL 0.6   ALK PHOS U/L 121*   ALT (SGPT) U/L 16   AST (SGOT) U/L 21   GLUCOSE mg/dL 162*     Results from last 7 days   Lab Units 10/18/24  2002   HSTROP T ng/L 16     @LABRCNTbnp@  Results from last 7 days   Lab Units 10/18/24  2002   WBC 10*3/mm3 6.48   HEMOGLOBIN g/dL 14.3   HEMATOCRIT % 41.0   PLATELETS 10*3/mm3 170     Results from last 7 days   Lab Units 10/18/24  2002   INR  1.04     Results from last 7 days   Lab Units 10/18/24  2002   MAGNESIUM mg/dL 2.1     @LABRCNTIP(chol,trig,hdl,ldl)    I personally viewed and interpreted the patient's EKG/Telemetry data     Assessment / Plan:     1.  Coronary artery disease / Inferior STEMI  -- Known history of multivessel coronary artery disease with remote CABG in 2000  -- Presented with an inferior STEMI, underwent PCI to the RPDA with HEMANTH x 1, widely patent grafts to the left coronary system  -- Initiate DAPT with aspirin and Brilinta  -- Start high intensity statin  -- Start beta-blocker as tolerated by BP and heart rate  -- Echocardiogram to evaluate LV systolic function  -- Right common femoral artery sheath to removed in 2 hours    2.  Hypertension  -- Titrate antihypertensives as indicated    3.  Hypothyroidism  -- Continue home Synthroid    4.  COPD  -- Hospital medicine consult    4.  Stage III CKD  -- IV fluids postcontrast exposure  -- Management per hospitalist          LENIN Otero MD  Interventional Cardiology     )10/18/24  21:44 EDT

## 2024-10-19 NOTE — PROGRESS NOTES
[facility] Cardiology IP Progress Note    Geovanny Kong  1945  [id number]  10/19/24    Subjective:   Mr. Geovanny Kong is a 79 y.o. male seen in follow-up following PCI to the RPDA yesterday.  No acute overnight events.  This morning, denies any recurrent back pain or shortness of breath.  No pain at the right common femoral access site.  No specific complaints.    Review of Systems:   Review of Systems   Constitutional:  Negative for activity change, appetite change, chills, diaphoresis, fatigue, fever, unexpected weight gain and unexpected weight loss.   Respiratory:  Negative for cough, chest tightness, shortness of breath and wheezing.    Cardiovascular:  Negative for chest pain, palpitations and leg swelling.   Gastrointestinal:  Negative for abdominal pain, anal bleeding, blood in stool and GERD.   Neurological:  Negative for dizziness, syncope, weakness and light-headedness.   Psychiatric/Behavioral:  Negative for depressed mood and stress. The patient is not nervous/anxious.        I have reviewed and/or updated the patient's past medical, past surgical, family history, social history, problem list and allergies as appropriate in the chart.     Physical Exam:  Vital Signs:   Vitals:    10/19/24 0630 10/19/24 0700 10/19/24 0721 10/19/24 0800   BP: 112/83 104/67  120/89   BP Location:       Patient Position:       Pulse: 73 66  67   Resp:   16    Temp:    97.4 °F (36.3 °C)   TempSrc:    Oral   SpO2: (!) 89% (!) 89%  93%   Weight:       Height:           Physical Exam  Vitals and nursing note reviewed.   Constitutional:       General: He is not in acute distress.     Appearance: Normal appearance. He is not diaphoretic.   HENT:      Head: Normocephalic and atraumatic.      Ears:      Comments: Hard of hearing  Cardiovascular:      Rate and Rhythm: Normal rate and regular rhythm.      Heart sounds: No murmur heard.  Pulmonary:      Effort: Pulmonary effort is normal. No respiratory  distress.      Breath sounds: Normal breath sounds. No stridor. No wheezing, rhonchi or rales.   Abdominal:      General: Bowel sounds are normal. There is no distension.      Palpations: Abdomen is soft.      Tenderness: There is no abdominal tenderness. There is no guarding or rebound.   Musculoskeletal:         General: No swelling. Normal range of motion.   Skin:     General: Skin is warm and dry.   Neurological:      General: No focal deficit present.      Mental Status: He is alert and oriented to person, place, and time.   Psychiatric:         Mood and Affect: Mood normal.         Behavior: Behavior normal.         Results Review:   Results from last 7 days   Lab Units 10/19/24  0508 10/18/24  2002   SODIUM mmol/L 141 138   POTASSIUM mmol/L 3.5 3.6   CHLORIDE mmol/L 106 101   CO2 mmol/L 22.6 24.5   BUN mg/dL 12 16   CREATININE mg/dL 1.01 1.38*   CALCIUM mg/dL 8.9 9.9   BILIRUBIN mg/dL  --  0.6   ALK PHOS U/L  --  121*   ALT (SGPT) U/L  --  16   AST (SGOT) U/L  --  21   GLUCOSE mg/dL 107* 162*     Results from last 7 days   Lab Units 10/18/24  2241 10/18/24  2002   HSTROP T ng/L 688* 16     Results from last 7 days   Lab Units 10/19/24  0508 10/18/24  2002   WBC 10*3/mm3 6.75 6.48   HEMOGLOBIN g/dL 13.5 14.3   HEMATOCRIT % 40.4 41.0   PLATELETS 10*3/mm3 166 170     Results from last 7 days   Lab Units 10/18/24  2002   INR  1.04     Results from last 7 days   Lab Units 10/18/24  2002   MAGNESIUM mg/dL 2.1     Results from last 7 days   Lab Units 10/19/24  0508   CHOLESTEROL mg/dL 144   TRIGLYCERIDES mg/dL 54   HDL CHOL mg/dL 54   LDL CHOL mg/dL 78       I personally viewed and interpreted the patient's EKG/Telemetry data     Medications:   )  Current Facility-Administered Medications:     acetaminophen (TYLENOL) tablet 650 mg, 650 mg, Oral, Q4H PRN, Lavon Otero MD, 650 mg at 10/18/24 2242    albuterol (PROVENTIL) nebulizer solution 0.083% 2.5 mg/3mL, 2.5 mg, Nebulization, Q6H PRN, Kerley, Brian Joseph,  DO    aspirin EC tablet 81 mg, 81 mg, Oral, Daily, Lavon Otero MD, 81 mg at 10/19/24 0836    atorvastatin (LIPITOR) tablet 80 mg, 80 mg, Oral, Nightly, Lavon Otero MD    atropine sulfate injection 0.5 mg, 0.5 mg, Intravenous, Q5 Min PRN, Lavon Otero MD    budesonide-formoterol (SYMBICORT) 160-4.5 MCG/ACT inhaler 2 puff, 2 puff, Inhalation, BID - RT, Kerley, Brian Joseph, DO, 2 puff at 10/19/24 0721    HYDROcodone-acetaminophen (NORCO) 5-325 MG per tablet 1 tablet, 1 tablet, Oral, Q6H PRN, Kerley, Brian Joseph, DO, 1 tablet at 10/19/24 0122    levothyroxine (SYNTHROID, LEVOTHROID) tablet 125 mcg, 125 mcg, Oral, Q AM, Lavon Otero MD, 125 mcg at 10/19/24 0637    mupirocin (BACTROBAN) 2 % nasal ointment 1 Application, 1 Application, Each Nare, BID, Lavon Otero MD    nitroglycerin (NITROSTAT) SL tablet 0.4 mg, 0.4 mg, Sublingual, Q5 Min PRN, Lavon Otero MD    sodium chloride 0.9 % flush 10 mL, 10 mL, Intravenous, PRN, Lavon Otero MD    ticagrelor (BRILINTA) tablet 90 mg, 90 mg, Oral, BID, Lavon Otero MD, 90 mg at 10/19/24 0836    tiotropium (SPIRIVA RESPIMAT) 2.5 mcg/act aerosol solution inhaler, 2 puff, Inhalation, Daily - RT, Kerley, Brian Joseph, DO, 2 puff at 10/19/24 0721    Assessment/plan:    1.  Coronary artery disease / Inferior STEMI  -- Known history of multivessel coronary artery disease with remote CABG in 2000  -- Presented with an inferior STEMI, underwent PCI to the DA with HEMANTH x 1, widely patent grafts to the left coronary system  -- Continue DAPT with aspirin and Brilinta  -- Continue high intensity statin  -- Start metoprolol XL  -- Echocardiogram to evaluate LV systolic function  -- Transfer to telemetry today, anticipate discharge home tomorrow     2.  Hypertension  -- Titrate antihypertensives as indicated     3.  Hypothyroidism  -- Continue home Synthroid     4.  COPD  -- Hospital medicine managing     4.  Stage III CKD  -- Renal function  leeanne Otero MD  Interventional Cardiology   10/19/24  10:10 EDT

## 2024-10-19 NOTE — NURSING NOTE
"Pt states o2 sensor \"keeps beeping and driving me nuts\" changed o2 sensor, pt states  \"take it off\" o2 sensor removed per pt request.   "

## 2024-10-19 NOTE — CONSULTS
[facility] HOSPITALIST   CONSULTATION      Name:  Geovanny Kong   Age:  79 y.o.  Sex:  male  :  1945  MRN:  [id number]   Visit Number:  [id number]  Admission Date:  10/18/2024  Date Of Service:  10/18/24  Primary Care Physician:  Rachele Gay DO    Consulting Physician:    Brian Joseph Kerley, DO    Referring Physician:    Dr. Otero    Reason For Consult:    Chronic medical management    Chief Complaint:     Status post STEMI with PCI    History Of Presenting Illness:      Geovanny Kong is an 79-year-old man with past medical history of dementia, paroxysmal atrial fibrillation, coronary disease with history of CABG, hypertension, type 2 diabetes, prostate cancer, hypothyroidism, COPD history of tobacco use.  Presented to [facility] ED on 10/18/2024 with chest pain, code STEMI called, PCI per cardiology-Dr. Otero.    Review Of Systems:     All systems were reviewed and negative except for that stated in HPI.    Past Medical History: Patient  has a past medical history of Cancer, Diabetes mellitus, CABG, and Hypertension.    Past Surgical History: Patient  has a past surgical history that includes Cardiac surgery and Prostate surgery.    Social History: Patient  reports that he has quit smoking. He has never used smokeless tobacco. He reports that he does not drink alcohol and does not use drugs.    Family History: Patient's family history has been reviewed and found to be noncontributory.     Allergies:      Patient has no known allergies.    Home Medications:    Prior to Admission Medications       Prescriptions Last Dose Informant Patient Reported? Taking?    albuterol sulfate HFA (Ventolin HFA) 108 (90 Base) MCG/ACT inhaler   No No    Inhale 2 puffs Every 4 (Four) Hours As Needed for Wheezing or Shortness of Air.    amLODIPine (NORVASC) 5 MG tablet   Yes No    amlodipine 5 mg tablet   Take 1 tablet every day by oral route in the evening for 90 days.    Aspirin Buf,CaCarb-MgCarb-MgO, 81 MG  tablet   Yes No    aspirin 81 mg tablet   Daily    cyclobenzaprine (FLEXERIL) 10 MG tablet   Yes No    cyclobenzaprine 10 mg tablet   TAKE 1 TABLET EVERY DAY    levothyroxine (SYNTHROID, LEVOTHROID) 125 MCG tablet   Yes No    levothyroxine 125 mcg tablet   Take 1 tablet every day by oral route for 90 days.    loratadine (CLARITIN) 10 MG tablet   Yes No    loratadine 10 mg tablet   TAKE 1 TABLET EVERY DAY AS NEEDED    montelukast (SINGULAIR) 10 MG tablet   No No    Take 1 tablet by mouth Every Night.    niacin 500 MG tablet   Yes No    nitroglycerin (NITROSTAT) 0.4 MG SL tablet   Yes No    nitroglycerin 0.4 mg sublingual tablet   Place 1 tablet as needed by sublingual route.    Patient not taking:  Reported on 10/18/2023    tamsulosin (FLOMAX) 0.4 MG capsule 24 hr capsule   Yes No    tamsulosin 0.4 mg capsule   Take 1 capsule every day by oral route for 90 days.    tiotropium bromide-olodaterol (STIOLTO RESPIMAT) 2.5-2.5 MCG/ACT aerosol solution inhaler   No No    Inhale 2 puffs Daily.             Medication Review:     I have reviewed the patient's active and prn medications.      Vital Signs:    Temp:  [97.6 °F (36.4 °C)] 97.6 °F (36.4 °C)  Heart Rate:  [76-91] 78  Resp:  [14-20] 14  BP: (121-135)/(67-91) 121/67        10/18/24  1953   Weight: 104 kg (230 lb)       Body mass index is 30.34 kg/m².    Physical Exam:     General Appearance:  Alert and cooperative.    Head:  Atraumatic and normocephalic.   Eyes: Conjunctivae and sclerae normal, no icterus. No pallor.   Ears:  Ears with no abnormalities noted.   Throat: No oral lesions, no thrush, oral mucosa moist.   Neck: Supple, trachea midline, no thyromegaly.   Back:   No kyphoscoliosis present. No tenderness to palpation.   Lungs:   Breath sounds heard bilaterally equally.  No crackles or wheezing. No pleural rub or bronchial breathing.   Heart:  Normal S1 and S2, no murmur, no gallop, no rub. No JVD.   Abdomen:   Normal bowel sounds, no masses, no organomegaly.  "Soft, nontender, nondistended, no rebound tenderness.   Extremities: Supple, no edema, no cyanosis, no clubbing.   Pulses: Pulses palpable bilaterally.   Skin: Warm.   Neurologic: Alert and oriented x 3. No facial asymmetry. Moves all four limbs. No tremors.      Laboratory data:    Results from last 7 days   Lab Units 10/18/24  2002   SODIUM mmol/L 138   POTASSIUM mmol/L 3.6   CHLORIDE mmol/L 101   CO2 mmol/L 24.5   BUN mg/dL 16   CREATININE mg/dL 1.38*   CALCIUM mg/dL 9.9   BILIRUBIN mg/dL 0.6   ALK PHOS U/L 121*   ALT (SGPT) U/L 16   AST (SGOT) U/L 21   GLUCOSE mg/dL 162*     Results from last 7 days   Lab Units 10/18/24  2002   WBC 10*3/mm3 6.48   HEMOGLOBIN g/dL 14.3   HEMATOCRIT % 41.0   PLATELETS 10*3/mm3 170     Results from last 7 days   Lab Units 10/18/24  2002   INR  1.04     Results from last 7 days   Lab Units 10/18/24  2002   HSTROP T ng/L 16                           Invalid input(s): \"USDES\", \"NITRITITE\", \"BACT\", \"EP\"  Pain Management Panel           No data to display                    EKG:      Inferior STEMI    Radiology:    XR Chest 1 View    Result Date: 10/18/2024  AP PORTABLE CHEST .  CLINICAL HISTORY: Chest pain.  COMPARISON: 7/21/2019.  FINDINGS: An AP portable view of the chest was obtained. There is been prior sternotomy. There is mild cardiomegaly without pulmonary edema. Lungs are clear. There is no pleural disease or acute osseous abnormality.      No acute disease.       This report was signed and finalized on 10/18/2024 8:47 PM by Geronimo Morris MD.      CT Angiogram Chest    Result Date: 10/18/2024  CT ANGIOGRAM OF THE CHEST  HISTORY: eval for dissection.  COMPARISON: None.  TECHNIQUE: Thin section axial images were obtained through the chest and during the arterial phase of IV contrast administration. Coronal 3D MIP reconstructed images were also provided.  FINDINGS: The pulmonary arteries are well opacified and there is no filling defect to indicate pulmonary embolism. The thoracic " aorta is normal. There is emphysema. The lungs are clear and there is no pleural disease, adenopathy or significant osseous abnormality.       No pulmonary embolism, aortic dissection or other significant abnormality.     This study was performed with techniques to keep radiation doses as low as reasonably achievable (ALARA). Individualized dose reduction techniques using automated exposure control or adjustment of vA and/or kV according to the patient size were employed.    CT ABDOMEN AND CT PELVIS,  CTA ABDOMEN, CTA PELVIS AND CTA LOWER EXTREMITY RUNOFF  HISTORY: As above  COMPARISON: None  TECHNIQUE: Thin section axial CT of the abdomen, pelvis and lower extremities with IV contrast supplemented with multiplanar reconstruction under CT Angiogram protocol  FINDINGS:  Non-vascular: The gallbladder, solid abdominal organs and ureters are unremarkable. A benign-appearing cystic lesion is seen in the left retroperitoneum posterior to the left kidney. The GI tract shows no abnormality. The urinary bladder is normal. There is no pelvic or abdominal ascites, adenopathy, or significant osseous abnormality.   CT ANGIOGRAM ABDOMEN AND PELVIS: The abdominal aorta is unremarkable with no evidence of dissection. The aortic branches are unremarkable as are the iliac arteries bilaterally.  CTA RIGHT LOWER EXTREMITY: Unremarkable.  CTA LEFT LOWER EXTREMITY: Unremarkable.  CONCLUSION: No significant abnormality.   This study was performed with techniques to keep radiation doses as low as reasonably achievable (ALARA). Individualized dose reduction techniques using automated exposure control or adjustment of vA and/or kV according to the patient size were employed.  This report was signed and finalized on 10/18/2024 8:45 PM by Geronimo Morris MD.      CT Angio Abdominal Aorta Bilateral Iliofem Runoff    Result Date: 10/18/2024  CT ANGIOGRAM OF THE CHEST  HISTORY: eval for dissection.  COMPARISON: None.  TECHNIQUE: Thin section axial  images were obtained through the chest and during the arterial phase of IV contrast administration. Coronal 3D MIP reconstructed images were also provided.  FINDINGS: The pulmonary arteries are well opacified and there is no filling defect to indicate pulmonary embolism. The thoracic aorta is normal. There is emphysema. The lungs are clear and there is no pleural disease, adenopathy or significant osseous abnormality.       No pulmonary embolism, aortic dissection or other significant abnormality.     This study was performed with techniques to keep radiation doses as low as reasonably achievable (ALARA). Individualized dose reduction techniques using automated exposure control or adjustment of vA and/or kV according to the patient size were employed.    CT ABDOMEN AND CT PELVIS,  CTA ABDOMEN, CTA PELVIS AND CTA LOWER EXTREMITY RUNOFF  HISTORY: As above  COMPARISON: None  TECHNIQUE: Thin section axial CT of the abdomen, pelvis and lower extremities with IV contrast supplemented with multiplanar reconstruction under CT Angiogram protocol  FINDINGS:  Non-vascular: The gallbladder, solid abdominal organs and ureters are unremarkable. A benign-appearing cystic lesion is seen in the left retroperitoneum posterior to the left kidney. The GI tract shows no abnormality. The urinary bladder is normal. There is no pelvic or abdominal ascites, adenopathy, or significant osseous abnormality.   CT ANGIOGRAM ABDOMEN AND PELVIS: The abdominal aorta is unremarkable with no evidence of dissection. The aortic branches are unremarkable as are the iliac arteries bilaterally.  CTA RIGHT LOWER EXTREMITY: Unremarkable.  CTA LEFT LOWER EXTREMITY: Unremarkable.  CONCLUSION: No significant abnormality.   This study was performed with techniques to keep radiation doses as low as reasonably achievable (ALARA). Individualized dose reduction techniques using automated exposure control or adjustment of vA and/or kV according to the patient size  were employed.  This report was signed and finalized on 10/18/2024 8:45 PM by Geronimo Morris MD.       Assessment/Recommendations/Plan:     Coronary artery disease/inferior STEMI  ICU admission.  Status post PCI.  Cardiology managing.  Hypertension  Continue home medications.  Hypothyroidism  Continue home medications.  COPD  Continue home medications.  Stage III CKD  IVF.  Postcontrast.    Thank you for this consult. Please do not hesitate to call me for any questions.    Brian Joseph Kerley,   10/18/24  22:32 EDT    Dictated utilizing Dragon dictation.

## 2024-10-19 NOTE — CASE MANAGEMENT/SOCIAL WORK
Discharge Planning Assessment   [facility]     Patient Name: Geovanny Kong  MRN: [id number]  Today's Date: 10/19/2024    Admit Date: 10/18/2024    Plan: Home with family   Discharge Needs Assessment       Row Name 10/19/24 1004       Living Environment    People in Home grandchild(phil);spouse    Current Living Arrangements home    Potentially Unsafe Housing Conditions none    In the past 12 months has the electric, gas, oil, or water company threatened to shut off services in your home? No    Primary Care Provided by self    Provides Primary Care For no one    Family Caregiver if Needed grandchild(phil), adult;spouse    Quality of Family Relationships involved    Able to Return to Prior Arrangements yes       Resource/Environmental Concerns    Resource/Environmental Concerns none    Transportation Concerns none       Transportation Needs    In the past 12 months, has lack of transportation kept you from medical appointments or from getting medications? no    In the past 12 months, has lack of transportation kept you from meetings, work, or from getting things needed for daily living? No       Food Insecurity    Within the past 12 months, you worried that your food would run out before you got the money to buy more. Never true    Within the past 12 months, the food you bought just didn't last and you didn't have money to get more. Never true       Transition Planning    Patient/Family Anticipates Transition to home with family    Patient/Family Anticipated Services at Transition none    Transportation Anticipated car, drives self;family or friend will provide       Discharge Needs Assessment    Readmission Within the Last 30 Days no previous admission in last 30 days    Equipment Currently Used at Home oxygen    Concerns to be Addressed no discharge needs identified    Do you want help finding or keeping work or a job? I do not need or want help    Do you want help with school or training? For example, starting or  completing job training or getting a high school diploma, GED or equivalent No    Anticipated Changes Related to Illness none    Equipment Needed After Discharge none    Provided Post Acute Provider List? N/A    Provided Post Acute Provider Quality & Resource List? N/A                   Discharge Plan       Row Name 10/19/24 1006       Plan    Plan Home with family    Patient/Family in Agreement with Plan yes    Plan Comments Spoke to pt regarding discharge plans .Confirmed  address,phone number and primary care provider as being correct  on face sheet .,He is independent with ADLS Lives with  his adult Granddaughter  and wife ,Uses Oxygen  2 LNC Nocturnal DME ROTECH .Drives to the doctor ,Plan is return  home, Declining Meds to Bed Denies any needs at this time                  Continued Care and Services - Admitted Since 10/18/2024    No active coordination exists for this encounter.          Demographic Summary       Row Name 10/19/24 1002       General Information    Admission Type inpatient    Arrived From emergency department    Required Notices Provided Important Message from Medicare    Referral Source admission list    Reason for Consult discharge planning    Preferred Language English                   Functional Status       Row Name 10/19/24 1003       Functional Status    Usual Activity Tolerance moderate       Physical Activity    On average, how many days per week do you engage in moderate to strenuous exercise (like a brisk walk)? 0 days    On average, how many minutes do you engage in exercise at this level? 0 min    Number of minutes of exercise per week 0       Functional Status, IADL    Medications independent    Meal Preparation assistive person    Housekeeping assistive person    Laundry assistive person    Shopping assistive person    If for any reason you need help with day-to-day activities such as bathing, preparing meals, shopping, managing finances, etc., do you get the help you need? I  don't need any help       Mental Status    General Appearance WDL WDL                   Psychosocial    No documentation.                  Abuse/Neglect    No documentation.                  Legal    No documentation.                  Substance Abuse    No documentation.                  Patient Forms    No documentation.                     Malou Llamas RN

## 2024-10-20 ENCOUNTER — READMISSION MANAGEMENT (OUTPATIENT)
Dept: CALL CENTER | Facility: HOSPITAL | Age: 79
End: 2024-10-20
Payer: MEDICARE

## 2024-10-20 ENCOUNTER — APPOINTMENT (OUTPATIENT)
Dept: CARDIOLOGY | Facility: HOSPITAL | Age: 79
DRG: 322 | End: 2024-10-20
Payer: MEDICARE

## 2024-10-20 VITALS
BODY MASS INDEX: 26.33 KG/M2 | HEIGHT: 73 IN | OXYGEN SATURATION: 93 % | SYSTOLIC BLOOD PRESSURE: 112 MMHG | RESPIRATION RATE: 18 BRPM | HEART RATE: 71 BPM | TEMPERATURE: 97.4 F | WEIGHT: 198.63 LBS | DIASTOLIC BLOOD PRESSURE: 87 MMHG

## 2024-10-20 LAB
ANION GAP SERPL CALCULATED.3IONS-SCNC: 9.7 MMOL/L (ref 5–15)
BH CV ECHO MEAS - AO MAX PG: 4.2 MMHG
BH CV ECHO MEAS - AO MEAN PG: 2 MMHG
BH CV ECHO MEAS - AO ROOT DIAM: 3.1 CM
BH CV ECHO MEAS - AO V2 MAX: 103 CM/SEC
BH CV ECHO MEAS - AO V2 VTI: 22.4 CM
BH CV ECHO MEAS - AVA(I,D): 3.1 CM2
BH CV ECHO MEAS - EDV(CUBED): 147.2 ML
BH CV ECHO MEAS - EDV(MOD-SP2): 117 ML
BH CV ECHO MEAS - EDV(MOD-SP4): 108 ML
BH CV ECHO MEAS - EF(MOD-BP): 59.4 %
BH CV ECHO MEAS - EF(MOD-SP2): 65.5 %
BH CV ECHO MEAS - EF(MOD-SP4): 54.7 %
BH CV ECHO MEAS - ESV(CUBED): 62.1 ML
BH CV ECHO MEAS - ESV(MOD-SP2): 40.4 ML
BH CV ECHO MEAS - ESV(MOD-SP4): 48.9 ML
BH CV ECHO MEAS - FS: 25 %
BH CV ECHO MEAS - IVS/LVPW: 1 CM
BH CV ECHO MEAS - IVSD: 1.29 CM
BH CV ECHO MEAS - LA DIMENSION: 4.9 CM
BH CV ECHO MEAS - LAT PEAK E' VEL: 7.6 CM/SEC
BH CV ECHO MEAS - LV DIASTOLIC VOL/BSA (35-75): 50.4 CM2
BH CV ECHO MEAS - LV MASS(C)D: 282.1 GRAMS
BH CV ECHO MEAS - LV MAX PG: 2.8 MMHG
BH CV ECHO MEAS - LV MEAN PG: 1 MMHG
BH CV ECHO MEAS - LV SYSTOLIC VOL/BSA (12-30): 22.8 CM2
BH CV ECHO MEAS - LV V1 MAX: 83.3 CM/SEC
BH CV ECHO MEAS - LV V1 VTI: 17.6 CM
BH CV ECHO MEAS - LVIDD: 5.3 CM
BH CV ECHO MEAS - LVIDS: 4 CM
BH CV ECHO MEAS - LVOT AREA: 3.9 CM2
BH CV ECHO MEAS - LVOT DIAM: 2.24 CM
BH CV ECHO MEAS - LVPWD: 1.29 CM
BH CV ECHO MEAS - MED PEAK E' VEL: 7.9 CM/SEC
BH CV ECHO MEAS - MV A MAX VEL: 118 CM/SEC
BH CV ECHO MEAS - MV DEC SLOPE: 230 CM/SEC2
BH CV ECHO MEAS - MV DEC TIME: 0.3 SEC
BH CV ECHO MEAS - MV E MAX VEL: 69.8 CM/SEC
BH CV ECHO MEAS - MV E/A: 0.59
BH CV ECHO MEAS - MV MAX PG: 5.8 MMHG
BH CV ECHO MEAS - MV MEAN PG: 2 MMHG
BH CV ECHO MEAS - MV V2 VTI: 41.5 CM
BH CV ECHO MEAS - MVA(VTI): 1.67 CM2
BH CV ECHO MEAS - PA ACC TIME: 0.07 SEC
BH CV ECHO MEAS - PA V2 MAX: 98.5 CM/SEC
BH CV ECHO MEAS - RAP SYSTOLE: 3 MMHG
BH CV ECHO MEAS - RV MAX PG: 1.65 MMHG
BH CV ECHO MEAS - RV V1 MAX: 64.3 CM/SEC
BH CV ECHO MEAS - RV V1 VTI: 8.2 CM
BH CV ECHO MEAS - RVSP: 27.6 MMHG
BH CV ECHO MEAS - SV(LVOT): 69.4 ML
BH CV ECHO MEAS - SV(MOD-SP2): 76.6 ML
BH CV ECHO MEAS - SV(MOD-SP4): 59.1 ML
BH CV ECHO MEAS - SVI(LVOT): 32.4 ML/M2
BH CV ECHO MEAS - SVI(MOD-SP2): 35.8 ML/M2
BH CV ECHO MEAS - SVI(MOD-SP4): 27.6 ML/M2
BH CV ECHO MEAS - TAPSE (>1.6): 1.23 CM
BH CV ECHO MEAS - TR MAX PG: 24.6 MMHG
BH CV ECHO MEAS - TR MAX VEL: 248 CM/SEC
BH CV ECHO MEASUREMENTS AVERAGE E/E' RATIO: 9.01
BH CV XLRA - RV BASE: 4.6 CM
BH CV XLRA - TDI S': 8.3 CM/SEC
BILIRUB UR QL STRIP: NEGATIVE
BUN SERPL-MCNC: 8 MG/DL (ref 8–23)
BUN/CREAT SERPL: 7.8 (ref 7–25)
CALCIUM SPEC-SCNC: 9 MG/DL (ref 8.6–10.5)
CHLORIDE SERPL-SCNC: 105 MMOL/L (ref 98–107)
CLARITY UR: CLEAR
CO2 SERPL-SCNC: 22.3 MMOL/L (ref 22–29)
COLOR UR: YELLOW
CREAT SERPL-MCNC: 1.02 MG/DL (ref 0.76–1.27)
EGFRCR SERPLBLD CKD-EPI 2021: 74.3 ML/MIN/1.73
GLUCOSE SERPL-MCNC: 84 MG/DL (ref 65–99)
GLUCOSE UR STRIP-MCNC: NEGATIVE MG/DL
HGB UR QL STRIP.AUTO: NEGATIVE
KETONES UR QL STRIP: NEGATIVE
LEFT ATRIUM VOLUME INDEX: 27.6 ML/M2
LEUKOCYTE ESTERASE UR QL STRIP.AUTO: NEGATIVE
MAGNESIUM SERPL-MCNC: 2 MG/DL (ref 1.6–2.4)
NITRITE UR QL STRIP: NEGATIVE
PH UR STRIP.AUTO: 7 [PH] (ref 5–8)
POTASSIUM SERPL-SCNC: 3.6 MMOL/L (ref 3.5–5.2)
PROT UR QL STRIP: NEGATIVE
SODIUM SERPL-SCNC: 137 MMOL/L (ref 136–145)
SP GR UR STRIP: 1.01 (ref 1–1.03)
TSH SERPL DL<=0.05 MIU/L-ACNC: 3.09 UIU/ML (ref 0.27–4.2)
UROBILINOGEN UR QL STRIP: NORMAL

## 2024-10-20 PROCEDURE — 94799 UNLISTED PULMONARY SVC/PX: CPT

## 2024-10-20 PROCEDURE — 93005 ELECTROCARDIOGRAM TRACING: CPT | Performed by: INTERNAL MEDICINE

## 2024-10-20 PROCEDURE — 93306 TTE W/DOPPLER COMPLETE: CPT

## 2024-10-20 PROCEDURE — 99231 SBSQ HOSP IP/OBS SF/LOW 25: CPT | Performed by: FAMILY MEDICINE

## 2024-10-20 PROCEDURE — 93306 TTE W/DOPPLER COMPLETE: CPT | Performed by: INTERNAL MEDICINE

## 2024-10-20 PROCEDURE — 93010 ELECTROCARDIOGRAM REPORT: CPT | Performed by: INTERNAL MEDICINE

## 2024-10-20 PROCEDURE — 83735 ASSAY OF MAGNESIUM: CPT | Performed by: FAMILY MEDICINE

## 2024-10-20 PROCEDURE — 81003 URINALYSIS AUTO W/O SCOPE: CPT | Performed by: FAMILY MEDICINE

## 2024-10-20 PROCEDURE — 94761 N-INVAS EAR/PLS OXIMETRY MLT: CPT

## 2024-10-20 PROCEDURE — 99239 HOSP IP/OBS DSCHRG MGMT >30: CPT | Performed by: INTERNAL MEDICINE

## 2024-10-20 PROCEDURE — 84443 ASSAY THYROID STIM HORMONE: CPT | Performed by: FAMILY MEDICINE

## 2024-10-20 PROCEDURE — 80048 BASIC METABOLIC PNL TOTAL CA: CPT | Performed by: INTERNAL MEDICINE

## 2024-10-20 RX ORDER — METOPROLOL SUCCINATE 25 MG/1
25 TABLET, EXTENDED RELEASE ORAL
Qty: 90 TABLET | Refills: 3 | Status: SHIPPED | OUTPATIENT
Start: 2024-10-21

## 2024-10-20 RX ORDER — ATORVASTATIN CALCIUM 80 MG/1
80 TABLET, FILM COATED ORAL NIGHTLY
Qty: 90 TABLET | Refills: 3 | Status: SHIPPED | OUTPATIENT
Start: 2024-10-20

## 2024-10-20 RX ORDER — DONEPEZIL HYDROCHLORIDE 5 MG/1
5 TABLET, FILM COATED ORAL NIGHTLY
Status: DISCONTINUED | OUTPATIENT
Start: 2024-10-20 | End: 2024-10-20 | Stop reason: HOSPADM

## 2024-10-20 RX ORDER — OXYBUTYNIN CHLORIDE 5 MG/1
10 TABLET, EXTENDED RELEASE ORAL DAILY
Status: DISCONTINUED | OUTPATIENT
Start: 2024-10-20 | End: 2024-10-20 | Stop reason: HOSPADM

## 2024-10-20 RX ORDER — MONTELUKAST SODIUM 10 MG/1
10 TABLET ORAL NIGHTLY
Status: DISCONTINUED | OUTPATIENT
Start: 2024-10-20 | End: 2024-10-20 | Stop reason: HOSPADM

## 2024-10-20 RX ORDER — POTASSIUM CHLORIDE 750 MG/1
40 CAPSULE, EXTENDED RELEASE ORAL ONCE
Status: COMPLETED | OUTPATIENT
Start: 2024-10-20 | End: 2024-10-20

## 2024-10-20 RX ORDER — ASPIRIN 81 MG/1
81 TABLET ORAL DAILY
Qty: 180 TABLET | Refills: 3 | Status: SHIPPED | OUTPATIENT
Start: 2024-10-21

## 2024-10-20 RX ADMIN — BUDESONIDE AND FORMOTEROL FUMARATE DIHYDRATE 2 PUFF: 160; 4.5 AEROSOL RESPIRATORY (INHALATION) at 07:45

## 2024-10-20 RX ADMIN — MUPIROCIN 1 APPLICATION: 20 OINTMENT TOPICAL at 08:26

## 2024-10-20 RX ADMIN — METOPROLOL SUCCINATE 25 MG: 25 TABLET, EXTENDED RELEASE ORAL at 08:26

## 2024-10-20 RX ADMIN — OXYBUTYNIN CHLORIDE 10 MG: 5 TABLET, EXTENDED RELEASE ORAL at 08:47

## 2024-10-20 RX ADMIN — LEVOTHYROXINE SODIUM 125 MCG: 125 TABLET ORAL at 06:40

## 2024-10-20 RX ADMIN — POTASSIUM CHLORIDE 40 MEQ: 750 CAPSULE, EXTENDED RELEASE ORAL at 08:47

## 2024-10-20 RX ADMIN — TICAGRELOR 90 MG: 90 TABLET ORAL at 08:25

## 2024-10-20 RX ADMIN — ASPIRIN 81 MG: 81 TABLET, COATED ORAL at 08:25

## 2024-10-20 RX ADMIN — TIOTROPIUM BROMIDE INHALATION SPRAY 2 PUFF: 3.12 SPRAY, METERED RESPIRATORY (INHALATION) at 07:45

## 2024-10-20 RX ADMIN — Medication 10 ML: at 08:26

## 2024-10-20 NOTE — PLAN OF CARE
Goal Outcome Evaluation:  Plan of Care Reviewed With: patient        Progress: improving  Outcome Evaluation: Patient had several runs of V tach throughout the shift. Dr. Otero notified prompty. VS stable. Patient is asymptomatic. Continuing to monitor closely

## 2024-10-20 NOTE — DISCHARGE SUMMARY
[facility] Discharge Summary     Geovanny Kong  1945  [id number]    Admission Date: 10/18/2024  Discharge Date: 10/20/24    Primary Discharge Diagnosis:   1.  Inferior STEMI  2.  Multivessel coronary artery disease, status post CABG    Secondary Discharge Diagnosis:   1.  Hypertension  2.  Hypothyroidism  3.  COPD  4.  Stage III CKD    Consults:   Consults       Date and Time Order Name Status Description    10/18/2024  9:50 PM Inpatient Hospitalist Consult Completed     10/18/2024  7:59 PM Consult Interventional Cardiology and Notify Cath Lab              Day of Discharge Exam:  Subjective: 79-year-old male seen in follow-up for coronary artery disease, status post PCI for inferior STEMI.  No recurrent chest pain or chest discomfort.  No specific complaints today.  Anticipating discharge home.    Vital Signs:  Temp:  [97.4 °F (36.3 °C)-98 °F (36.7 °C)] 97.4 °F (36.3 °C)  Heart Rate:  [58-81] 71  Resp:  [16-18] 18  BP: (105-139)/(57-87) 112/87    Physical Exam  Vitals and nursing note reviewed.   Constitutional:       General: He is not in acute distress.     Appearance: Normal appearance. He is not diaphoretic.   HENT:      Head: Normocephalic and atraumatic.   Cardiovascular:      Rate and Rhythm: Normal rate and regular rhythm.      Heart sounds: No murmur heard.  Pulmonary:      Effort: Pulmonary effort is normal. No respiratory distress.      Breath sounds: Normal breath sounds. No stridor. No wheezing, rhonchi or rales.   Abdominal:      General: Bowel sounds are normal. There is no distension.      Palpations: Abdomen is soft.      Tenderness: There is no abdominal tenderness. There is no guarding or rebound.   Musculoskeletal:         General: No swelling. Normal range of motion.      Cervical back: Neck supple. No tenderness.   Skin:     General: Skin is warm and dry.   Neurological:      General: No focal deficit present.      Mental Status: He is alert and oriented to  person, place, and time.   Psychiatric:         Mood and Affect: Mood normal.         Behavior: Behavior normal.         Hospital Course:   Mr. Kong is an 79-year-old male with a past cardiac history significant for multivessel coronary artery disease with remote CABG in 2000.  He presented with an inferior STEMI, and underwent PCI to the [facility].  Following PCI, he was initiated on dual antiplatelet therapy with aspirin and Brilinta in addition to guideline directed medical therapy.  A post MI echocardiogram showed preserved LV systolic function.  Patient does have stage III CKD, with his renal function remaining stable following PCI.  Upon discharge, he will be scheduled to follow-up with the cardiology clinic in 1 month.  He will be referred for cardiac rehab as outpatient.    Procedures Performed  1.  Coronary angiogram 3/18/2024 with PCI to the [facility]    Pertinent Test Results:   1.  Echocardiogram 10/18/2024  1.  Normal left ventricular size and systolic function, LVEF 55-60.  2.  Normal LV diastolic filling pattern.  3.  Normal right ventricular size and systolic function.  4.  Normal left atrial volume index.  5.  Mild mitral regurgitation.  6.  Mild tricuspid regurgitation.    Condition on Discharge: Stable for discharge    Discharge Disposition  Home or Self Care    Discharge Medications     Discharge Medications        New Medications        Instructions Start Date   aspirin 81 MG EC tablet  Replaces: Aspirin Buf(CaCarb-MgCarb-MgO) 81 MG tablet   81 mg, Oral, Daily   Start Date: October 21, 2024     atorvastatin 80 MG tablet  Commonly known as: LIPITOR   80 mg, Oral, Nightly      metoprolol succinate XL 25 MG 24 hr tablet  Commonly known as: TOPROL-XL   25 mg, Oral, Every 24 Hours Scheduled   Start Date: October 21, 2024     ticagrelor 90 MG tablet tablet  Commonly known as: BRILINTA   90 mg, Oral, 2 Times Daily             Continue These Medications        Instructions Start Date   albuterol sulfate   (90 Base) MCG/ACT inhaler  Commonly known as: Ventolin HFA   2 puffs, Inhalation, Every 4 Hours PRN      cyclobenzaprine 10 MG tablet  Commonly known as: FLEXERIL   cyclobenzaprine 10 mg tablet   TAKE 1 TABLET EVERY DAY      levothyroxine 125 MCG tablet  Commonly known as: SYNTHROID, LEVOTHROID   levothyroxine 125 mcg tablet   Take 1 tablet every day by oral route for 90 days.      loratadine 10 MG tablet  Commonly known as: CLARITIN   loratadine 10 mg tablet   TAKE 1 TABLET EVERY DAY AS NEEDED      nitroglycerin 0.4 MG SL tablet  Commonly known as: NITROSTAT   nitroglycerin 0.4 mg sublingual tablet   Place 1 tablet as needed by sublingual route.      tamsulosin 0.4 MG capsule 24 hr capsule  Commonly known as: FLOMAX   tamsulosin 0.4 mg capsule   Take 1 capsule every day by oral route for 90 days.      tiotropium bromide-olodaterol 2.5-2.5 MCG/ACT aerosol solution inhaler  Commonly known as: STIOLTO RESPIMAT   2 puffs, Inhalation, Daily             Stop These Medications      amLODIPine 5 MG tablet  Commonly known as: NORVASC     Aspirin Buf(CaCarb-MgCarb-MgO) 81 MG tablet  Replaced by: aspirin 81 MG EC tablet     niacin 500 MG tablet            ASK your doctor about these medications        Instructions Start Date   montelukast 10 MG tablet  Commonly known as: SINGULAIR   10 mg, Oral, Nightly               Discharge Diet: Cardiac diet    Follow-up Appointments  Future Appointments   Date Time Provider Department Center   12/17/2024  9:45 AM Adis Gasca MD [facility]         Test Results Pending at Discharge: None       Chris Otero MD  Interventional Cardiology   10/20/24  11:03 EDT    Time: Discharge 55 min    Please note that portions of this note may have been completed with a voice recognition program. Efforts were made to edit the dictations, but occasionally words are mistranscribed.

## 2024-10-20 NOTE — NURSING NOTE
Patient started having frequent episodes of runs of VTACH around 1904-3070 ranging from 5-17 beats. Patient is asymptomatic lying in bed with no s/s distress. VSS. Dr. Otero paged and made aware. No new orders at this time. Care continues.

## 2024-10-20 NOTE — CASE MANAGEMENT/SOCIAL WORK
Case Management Discharge Note           Provided Post Acute Provider List?: N/A  Provided Post Acute Provider Quality & Resource List?: N/A    Selected Continued Care - Admitted Since 10/18/2024       Destination    No services have been selected for the patient.                Durable Medical Equipment    No services have been selected for the patient.                Dialysis/Infusion    No services have been selected for the patient.                Home Medical Care    No services have been selected for the patient.                Therapy    No services have been selected for the patient.                Community Resources    No services have been selected for the patient.                Community & DME    No services have been selected for the patient.                    Transportation Services  Private: Car    Final Discharge Disposition Code: 01 - home or self-care

## 2024-10-20 NOTE — PLAN OF CARE
Problem: Adult Inpatient Plan of Care  Goal: Plan of Care Review  10/20/2024 1103 by Beverley Kaminski RN  Outcome: Met  10/20/2024 1103 by Beverley Kaminski RN  Outcome: Met  Goal: Patient-Specific Goal (Individualized)  10/20/2024 1103 by Beverley Kaminski RN  Outcome: Met  10/20/2024 1103 by Beverley Kaminski RN  Outcome: Met  Goal: Absence of Hospital-Acquired Illness or Injury  10/20/2024 1103 by Beverley Kaminski RN  Outcome: Met  10/20/2024 1103 by Beverley Kaminski RN  Outcome: Met  Intervention: Identify and Manage Fall Risk  Recent Flowsheet Documentation  Taken 10/20/2024 1000 by Beverley Kaminski RN  Safety Promotion/Fall Prevention:   assistive device/personal items within reach   clutter free environment maintained   fall prevention program maintained   nonskid shoes/slippers when out of bed   safety round/check completed  Intervention: Prevent Skin Injury  Recent Flowsheet Documentation  Taken 10/20/2024 1000 by Beverley Kaminski RN  Body Position:   left   turned  Intervention: Prevent Infection  Recent Flowsheet Documentation  Taken 10/20/2024 1000 by Beverley Kaminski RN  Infection Prevention:   hand hygiene promoted   rest/sleep promoted   single patient room provided  Goal: Optimal Comfort and Wellbeing  10/20/2024 1103 by Beverley Kaminski RN  Outcome: Met  10/20/2024 1103 by Beverley Kaminski RN  Outcome: Met  Intervention: Provide Person-Centered Care  Recent Flowsheet Documentation  Taken 10/20/2024 0800 by Beverley Kaminski RN  Trust Relationship/Rapport:   care explained   choices provided  Goal: Readiness for Transition of Care  Outcome: Met     Problem: Comorbidity Management  Goal: Blood Pressure in Desired Range  Outcome: Met  Intervention: Maintain Blood Pressure Management  Recent Flowsheet Documentation  Taken 10/20/2024 1000 by Beverley Kaminski RN  Medication Review/Management: medications reviewed   Goal Outcome Evaluation:                    Patient appropriate for discharge  per provider order

## 2024-10-20 NOTE — OUTREACH NOTE
Prep Survey      Flowsheet Row Responses   [facility] discharged from? [facility]   Is LACE score < 7 ? No   Eligibility Readm Mgmt   Discharge diagnosis STEMI,  Echo normal, history of stent 3/18/24.   Does the patient have one of the following disease processes/diagnoses(primary or secondary)? Acute MI (STEMI,NSTEMI)   Does the patient have Home health ordered? No   Is there a DME ordered? No   Prep survey completed? Yes            Isela Mercado Registered Nurse

## 2024-10-20 NOTE — PROGRESS NOTES
[facility]    Name:  Geovanny Kong   Age:  79 y.o.  Sex:  male  :  1945  MRN:  [id number]   Visit Number:  [id number]  Admission Date:  10/18/2024  Date Of Service:  10/20/24  Primary Care Physician:  Rachele Gay DO     LOS: 2 days :    Chief Complaint:      Follow-up STEMI    Subjective:    Patient seen this morning at bedside at request of cardiology service.  He was awake alert and oriented x 3.  Wife at bedside.  She notes he is at his baseline.  He denied any chest pain palpitation shortness of breath.  He is hopeful to go home today after talking with cardiology.    Hospital Course:    Chronically ill 79-year-old with a history of atrial fibrillation, dementia, CAD with prior CABG, hypertension, diabetes, hypothyroidism, COPD, who presented to the emergency room with complaints of chest pain.  Patient had evidence of initial STEMI and was taken for coronary angiography with PCI by Dr. Otero on 10/18/2024.  He underwent PCI to the RPDA with a drug-eluting stent x 1, appeared to have widely patent grafts to the left coronary system.  He was continued on aspirin and Brilinta in addition to high intensity statin therapy and was started on metoprolol.    Patient has remained hemodynamically stable throughout hospital stay.  His home medications were restarted.  He did have somewhat mild confusion overnight, however this had improved earlier this morning and he was back to his baseline.  Wife notes that patient does not typically do well in the hospital and does better at home in regards to his mental status.    Review of Systems:     All systems were reviewed and negative except as mentioned in subjective, assessment and plan.    Vital Signs:    Temp:  [97.4 °F (36.3 °C)-98 °F (36.7 °C)] 97.4 °F (36.3 °C)  Heart Rate:  [58-81] 71  Resp:  [14-18] 18  BP: (105-139)/() 112/87    Intake and output:    I/O last 3 completed shifts:  In: 1332  "[P.O.:480; I.V.:852]  Out: 4025 [Urine:4025]  I/O this shift:  In: -   Out: 400 [Urine:400]    Physical Examination:    General Appearance:  Alert and cooperative.  NAD   Head:  Atraumatic and normocephalic.   Eyes: Conjunctivae and sclerae normal, no icterus. No pallor.   Throat: No oral lesions, no thrush, oral mucosa moist.   Neck: Supple, trachea midline, no thyromegaly.   Lungs:   Breath sounds heard bilaterally equally.  No wheezing or crackles. No Pleural rub or bronchial breathing.   Heart:  Normal S1 and S2, no murmur, no gallop, no rub. No JVD.   Abdomen:   Normal bowel sounds, no masses, no organomegaly. Soft, nontender, nondistended, no rebound tenderness.   Extremities: Supple, no edema, no cyanosis, no clubbing.   Skin: No bleeding or rash.   Neurologic: Alert and oriented x 3. No facial asymmetry. Moves all four limbs. No tremors.      Laboratory results:    Results from last 7 days   Lab Units 10/20/24  0604 10/19/24  0508 10/18/24  2002   SODIUM mmol/L 137 141 138   POTASSIUM mmol/L 3.6 3.5 3.6   CHLORIDE mmol/L 105 106 101   CO2 mmol/L 22.3 22.6 24.5   BUN mg/dL 8 12 16   CREATININE mg/dL 1.02 1.01 1.38*   CALCIUM mg/dL 9.0 8.9 9.9   BILIRUBIN mg/dL  --   --  0.6   ALK PHOS U/L  --   --  121*   ALT (SGPT) U/L  --   --  16   AST (SGOT) U/L  --   --  21   GLUCOSE mg/dL 84 107* 162*     Results from last 7 days   Lab Units 10/19/24  0508 10/18/24  2002   WBC 10*3/mm3 6.75 6.48   HEMOGLOBIN g/dL 13.5 14.3   HEMATOCRIT % 40.4 41.0   PLATELETS 10*3/mm3 166 170     Results from last 7 days   Lab Units 10/18/24  2002   INR  1.04     Results from last 7 days   Lab Units 10/18/24  2241 10/18/24  2002   HSTROP T ng/L 688* 16         No results for input(s): \"PHART\", \"FDU1STK\", \"PO2ART\", \"XXI2BYH\", \"BASEEXCESS\" in the last 8760 hours.   I have reviewed the patient's laboratory results.    Radiology results:    XR Chest 1 View    Result Date: 10/18/2024  AP PORTABLE CHEST .  CLINICAL HISTORY: Chest pain.  " COMPARISON: 7/21/2019.  FINDINGS: An AP portable view of the chest was obtained. There is been prior sternotomy. There is mild cardiomegaly without pulmonary edema. Lungs are clear. There is no pleural disease or acute osseous abnormality.      Impression: No acute disease.       This report was signed and finalized on 10/18/2024 8:47 PM by Geronimo Morris MD.      CT Angiogram Chest    Result Date: 10/18/2024  CT ANGIOGRAM OF THE CHEST  HISTORY: eval for dissection.  COMPARISON: None.  TECHNIQUE: Thin section axial images were obtained through the chest and during the arterial phase of IV contrast administration. Coronal 3D MIP reconstructed images were also provided.  FINDINGS: The pulmonary arteries are well opacified and there is no filling defect to indicate pulmonary embolism. The thoracic aorta is normal. There is emphysema. The lungs are clear and there is no pleural disease, adenopathy or significant osseous abnormality.       Impression: No pulmonary embolism, aortic dissection or other significant abnormality.     This study was performed with techniques to keep radiation doses as low as reasonably achievable (ALARA). Individualized dose reduction techniques using automated exposure control or adjustment of vA and/or kV according to the patient size were employed.    CT ABDOMEN AND CT PELVIS,  CTA ABDOMEN, CTA PELVIS AND CTA LOWER EXTREMITY RUNOFF  HISTORY: As above  COMPARISON: None  TECHNIQUE: Thin section axial CT of the abdomen, pelvis and lower extremities with IV contrast supplemented with multiplanar reconstruction under CT Angiogram protocol  FINDINGS:  Non-vascular: The gallbladder, solid abdominal organs and ureters are unremarkable. A benign-appearing cystic lesion is seen in the left retroperitoneum posterior to the left kidney. The GI tract shows no abnormality. The urinary bladder is normal. There is no pelvic or abdominal ascites, adenopathy, or significant osseous abnormality.   CT ANGIOGRAM  ABDOMEN AND PELVIS: The abdominal aorta is unremarkable with no evidence of dissection. The aortic branches are unremarkable as are the iliac arteries bilaterally.  CTA RIGHT LOWER EXTREMITY: Unremarkable.  CTA LEFT LOWER EXTREMITY: Unremarkable.  CONCLUSION: No significant abnormality.   This study was performed with techniques to keep radiation doses as low as reasonably achievable (ALARA). Individualized dose reduction techniques using automated exposure control or adjustment of vA and/or kV according to the patient size were employed.  This report was signed and finalized on 10/18/2024 8:45 PM by Geronimo Morris MD.      CT Angio Abdominal Aorta Bilateral Iliofem Runoff    Result Date: 10/18/2024  CT ANGIOGRAM OF THE CHEST  HISTORY: eval for dissection.  COMPARISON: None.  TECHNIQUE: Thin section axial images were obtained through the chest and during the arterial phase of IV contrast administration. Coronal 3D MIP reconstructed images were also provided.  FINDINGS: The pulmonary arteries are well opacified and there is no filling defect to indicate pulmonary embolism. The thoracic aorta is normal. There is emphysema. The lungs are clear and there is no pleural disease, adenopathy or significant osseous abnormality.       Impression: No pulmonary embolism, aortic dissection or other significant abnormality.     This study was performed with techniques to keep radiation doses as low as reasonably achievable (ALARA). Individualized dose reduction techniques using automated exposure control or adjustment of vA and/or kV according to the patient size were employed.    CT ABDOMEN AND CT PELVIS,  CTA ABDOMEN, CTA PELVIS AND CTA LOWER EXTREMITY RUNOFF  HISTORY: As above  COMPARISON: None  TECHNIQUE: Thin section axial CT of the abdomen, pelvis and lower extremities with IV contrast supplemented with multiplanar reconstruction under CT Angiogram protocol  FINDINGS:  Non-vascular: The gallbladder, solid abdominal organs and  ureters are unremarkable. A benign-appearing cystic lesion is seen in the left retroperitoneum posterior to the left kidney. The GI tract shows no abnormality. The urinary bladder is normal. There is no pelvic or abdominal ascites, adenopathy, or significant osseous abnormality.   CT ANGIOGRAM ABDOMEN AND PELVIS: The abdominal aorta is unremarkable with no evidence of dissection. The aortic branches are unremarkable as are the iliac arteries bilaterally.  CTA RIGHT LOWER EXTREMITY: Unremarkable.  CTA LEFT LOWER EXTREMITY: Unremarkable.  CONCLUSION: No significant abnormality.   This study was performed with techniques to keep radiation doses as low as reasonably achievable (ALARA). Individualized dose reduction techniques using automated exposure control or adjustment of vA and/or kV according to the patient size were employed.  This report was signed and finalized on 10/18/2024 8:45 PM by Geronimo Morris MD.     I have reviewed the patient's radiology reports.    Medication Review:     I have reviewed the patient's active and prn medications.     Problem List:      STEMI involving right coronary artery      Assessment:    STEMI  Hypertension  Hypothyroidism  COPD  Stage III CKD    Plan:    No issues from medical standpoint with discharge today.  He should be continued on dual antiplatelet therapy, metoprolol, high intensity statin therapy.  Patient has had intermittent NSVT while in the hospital, asymptomatic, Dr. Otero aware.    Patient's mental status is at baseline, he should continue his home donepezil upon discharge.  Kidney function is stable at this time as well.    Of note, wife did note patient wears 2 L of oxygen at night, she thinks it is due to sleep apnea.  May need follow-up with pulmonology locally to discuss.    Please contact if need any further assistance  Jeanna Rahman DO  10/20/24  08:38 EDT    Dictated utilizing Dragon dictation.

## 2024-10-21 LAB
QT INTERVAL: 454 MS
QTC INTERVAL: 600 MS

## 2024-10-22 ENCOUNTER — READMISSION MANAGEMENT (OUTPATIENT)
Dept: CALL CENTER | Facility: HOSPITAL | Age: 79
End: 2024-10-22
Payer: MEDICARE

## 2024-10-22 NOTE — OUTREACH NOTE
AMI Week 1 Survey      Flowsheet Row Responses   [facility] patient discharged from? [facility]   Does the patient have one of the following disease processes/diagnoses(primary or secondary)? Acute MI (STEMI,NSTEMI)   Week 1 attempt successful? Yes   Call start time 1234   Call end time 1238   Discharge diagnosis STEMI,  Echo normal, history of stent 3/18/24.   Medication alerts for this patient ASA-Brilinta--falls and bleeding precautions advised   Meds reviewed with patient/caregiver? Yes   Is the patient having any side effects they believe may be caused by any medication additions or changes? No   Does the patient have all prescriptions related to this admission filled (includes statins,anticoagulants,HTN meds,anti-arrhythmia meds) Yes   Is the patient taking all medications as directed (includes completed medication regime)? Yes   Does the patient have an appointment with their PCP,cardiologist,or clinic within 7 days of discharge? Yes  [PCP appt on 10/22/24]   Has the patient kept scheduled appointments due by today? Yes   Comments cardio 11/19/24   Has home health visited the patient within 72 hours of discharge? N/A   Psychosocial issues? No   Did the patient receive a copy of their discharge instructions? Yes   Nursing interventions Reviewed instructions with patient   What is the patient's perception of their health status since discharge? Improving  [Pt reports he is doing better and denies any complaints.  Reviewed cardiac s/s and need to seek care.  Pt has an appt today with PCP.]   Nursing interventions Nurse provided patient education   Is the patient/caregiver able to teach back signs and symptoms of when to call for help immediately: Sudden chest discomfort, Shortness of breath at any time, Sudden sweating or clammy skin, Nausea or vomiting, Dizziness or lightheadedness, Sudden discomfort in arms, back, neck or jaw, Irregular or rapid heart rate  [reviewed]   Nursing interventions Nurse provided  patient education   Is the patient/caregiver able to teach back ways to prevent a second heart attack: Take medications, Follow up with MD   Is the patient/caregiver able to teach back the hierarchy of who to call/visit for symptoms/problems? PCP, Specialist, Home health nurse, Urgent Care, ED, 911 Yes   Week 1 call completed? Yes   Call end time 9464            DIMPLE RICHARDSON - Registered Nurse

## 2024-10-30 ENCOUNTER — READMISSION MANAGEMENT (OUTPATIENT)
Dept: CALL CENTER | Facility: HOSPITAL | Age: 79
End: 2024-10-30
Payer: MEDICARE

## 2024-10-30 NOTE — OUTREACH NOTE
AMI Week 2 Survey      Flowsheet Row Responses   [facility] patient discharged from? [facility]   Does the patient have one of the following disease processes/diagnoses(primary or secondary)? Acute MI (STEMI,NSTEMI)   Week 2 attempt successful? Yes   Call start time 1458   Call end time 1500   Discharge diagnosis STEMI,  Echo normal, history of stent 3/18/24.   Meds reviewed with patient/caregiver? Yes   Is the patient having any side effects they believe may be caused by any medication additions or changes? No   Does the patient have all prescriptions related to this admission filled (includes statins,anticoagulants,HTN meds,anti-arrhythmia meds) Yes   Is the patient taking all medications as directed (includes completed medication regime)? Yes   Does the patient have a primary care provider?  Yes   Does the patient have an appointment with their PCP,cardiologist,or clinic within 7 days of discharge? Greater than 7 days   Has the patient kept scheduled appointments due by today? N/A   Comments cardiology with 11/19   Psychosocial issues? No   What is the patient's perception of their health status since discharge? Improving   Is the patient/caregiver able to teach back signs and symptoms of when to call for help immediately: Sudden chest discomfort, Sudden discomfort in arms, back, neck or jaw, Shortness of breath at any time, Sudden sweating or clammy skin, Dizziness or lightheadedness, Nausea or vomiting, Irregular or rapid heart rate   Nursing interventions Nurse provided patient education   Is the patient/caregiver able to teach back lifestyle changes to help prevent MIs Heart healthy diet, Regular exercise as approved by provider   Is the patient/caregiver able to teach back ways to prevent a second heart attack: Follow up with MD, Take medications   If the patient is a current smoker, are they able to teach back resources for cessation? Not a smoker   Is the patient/caregiver able to teach back the  hierarchy of who to call/visit for symptoms/problems? PCP, Specialist, Home health nurse, Urgent Care, ED, 911 Yes   Additional teach back comments He is doing well and he will make appt with PCP.  Does have f/u with cardiology   Week 2 call completed? Yes   Revoked No further contact(revokes)-requires comment   Graduated/Revoked comments No questions or needs at this time.   Call end time 1500            Nunu HASKINS - Licensed Nurse

## 2024-11-19 ENCOUNTER — OFFICE VISIT (OUTPATIENT)
Dept: CARDIOLOGY | Facility: CLINIC | Age: 79
End: 2024-11-19
Payer: MEDICARE

## 2024-11-19 VITALS
OXYGEN SATURATION: 95 % | HEIGHT: 73 IN | WEIGHT: 201 LBS | BODY MASS INDEX: 26.64 KG/M2 | DIASTOLIC BLOOD PRESSURE: 68 MMHG | SYSTOLIC BLOOD PRESSURE: 128 MMHG | HEART RATE: 90 BPM

## 2024-11-19 DIAGNOSIS — N18.2 CKD (CHRONIC KIDNEY DISEASE) STAGE 2, GFR 60-89 ML/MIN: ICD-10-CM

## 2024-11-19 DIAGNOSIS — E78.00 PURE HYPERCHOLESTEROLEMIA: ICD-10-CM

## 2024-11-19 DIAGNOSIS — I10 PRIMARY HYPERTENSION: ICD-10-CM

## 2024-11-19 DIAGNOSIS — I25.10 CORONARY ARTERY DISEASE INVOLVING NATIVE CORONARY ARTERY OF NATIVE HEART WITHOUT ANGINA PECTORIS: Primary | ICD-10-CM

## 2024-11-19 NOTE — PROGRESS NOTES
[facility] Cardiology Office Follow Up Note    Geovanny Kong  [id number]  2024    Primary Care Provider: Rachele Gay DO    Chief Complaint: Hospital follow-up    History of Present Illness:   Mr. Geovanny Kong is a 79 y.o. male who presents to the Cardiology Clinic for hospital follow-up.  The patient has a past medical history significant for stage III chronic kidney disease, COPD, hypertension, and hypothyroidism.  Has a past cardiac history significant for multivessel coronary artery disease with remote CABG.  More recently, he presented with an inferior STEMI in 10/24 and underwent PCI to the RPDA.  He presents today for follow-up.  Since hospital discharge, the patient reports he has been doing well.  He has not had any recurrent chest pain or exertional chest discomfort.  He has been tolerating DAPT with aspirin and Brilinta without significant bleeding or bruising.  No specific complaints today.    Past Cardiac Testin. Last Coronary Angio: 10/24  1.  Thrombotic occlusion of the native RPDA as culprit for inferior STEMI       -Successful PCI with placement of a 2.5 x 18 mm Xience HEMANTH  2.  Severe native multivessel coronary artery disease  3.  Widely patent LIMA-LAD  4.  Widely patent SVG-diagonal branch  5.  Widely patent SVG-OM  6.  Normal LVEDP (9 mmHg)  2. Prior Stress Testing: Remote  3. Last Echo: 10/24  1.  Normal left ventricular size and systolic function, LVEF 55-60.  2.  Normal LV diastolic filling pattern.  3.  Normal right ventricular size and systolic function.  4.  Normal left atrial volume index.  5.  Mild mitral regurgitation.  6.  Mild tricuspid regurgitation.  4. Prior Holter Monitor: None    Review of Systems:   Review of Systems   Constitutional:  Negative for activity change, appetite change, chills, diaphoresis, fatigue, fever, unexpected weight gain and unexpected weight loss.   Eyes:  Negative for blurred vision and double vision.    Respiratory:  Negative for cough, chest tightness, shortness of breath and wheezing.    Cardiovascular:  Negative for chest pain, palpitations and leg swelling.   Gastrointestinal:  Negative for abdominal pain, anal bleeding, blood in stool and GERD.   Endocrine: Negative for cold intolerance and heat intolerance.   Genitourinary:  Negative for hematuria.   Neurological:  Negative for dizziness, syncope, weakness and light-headedness.   Hematological:  Does not bruise/bleed easily.   Psychiatric/Behavioral:  Negative for depressed mood and stress. The patient is not nervous/anxious.        I have reviewed and/or updated the patient's past medical, past surgical, family, social history, problem list and allergies as appropriate.     Medications:     Current Outpatient Medications:     albuterol sulfate HFA (Ventolin HFA) 108 (90 Base) MCG/ACT inhaler, Inhale 2 puffs Every 4 (Four) Hours As Needed for Wheezing or Shortness of Air., Disp: 18 g, Rfl: 5    aspirin 81 MG EC tablet, Take 1 tablet by mouth Daily., Disp: 180 tablet, Rfl: 3    atorvastatin (LIPITOR) 80 MG tablet, Take 1 tablet by mouth Every Night., Disp: 90 tablet, Rfl: 3    cyclobenzaprine (FLEXERIL) 10 MG tablet, cyclobenzaprine 10 mg tablet  TAKE 1 TABLET EVERY DAY, Disp: , Rfl:     levothyroxine (SYNTHROID, LEVOTHROID) 125 MCG tablet, levothyroxine 125 mcg tablet  Take 1 tablet every day by oral route for 90 days., Disp: , Rfl:     loratadine (CLARITIN) 10 MG tablet, loratadine 10 mg tablet  TAKE 1 TABLET EVERY DAY AS NEEDED, Disp: , Rfl:     metoprolol succinate XL (TOPROL-XL) 25 MG 24 hr tablet, Take 1 tablet by mouth Daily., Disp: 90 tablet, Rfl: 3    montelukast (SINGULAIR) 10 MG tablet, Take 1 tablet by mouth Every Night., Disp: 90 tablet, Rfl: 1    nitroglycerin (NITROSTAT) 0.4 MG SL tablet, nitroglycerin 0.4 mg sublingual tablet  Place 1 tablet as needed by sublingual route., Disp: , Rfl:     tamsulosin (FLOMAX) 0.4 MG capsule 24 hr capsule,  "tamsulosin 0.4 mg capsule  Take 1 capsule every day by oral route for 90 days., Disp: , Rfl:     ticagrelor (BRILINTA) 90 MG tablet tablet, Take 1 tablet by mouth 2 (Two) Times a Day., Disp: 180 tablet, Rfl: 3    tiotropium bromide-olodaterol (STIOLTO RESPIMAT) 2.5-2.5 MCG/ACT aerosol solution inhaler, Inhale 2 puffs Daily., Disp: 1 each, Rfl: 5    Physical Exam:  Vital Signs:   Vitals:    11/19/24 1348   BP: 128/68   BP Location: Left arm   Patient Position: Sitting   Pulse: 90   SpO2: 95%   Weight: 91.2 kg (201 lb)   Height: 185.4 cm (72.99\")       Physical Exam  Constitutional:       General: He is not in acute distress.     Appearance: Normal appearance. He is not diaphoretic.   HENT:      Head: Normocephalic and atraumatic.   Cardiovascular:      Rate and Rhythm: Normal rate and regular rhythm.      Heart sounds: No murmur heard.  Pulmonary:      Effort: Pulmonary effort is normal. No respiratory distress.      Breath sounds: Normal breath sounds. No stridor. No wheezing, rhonchi or rales.   Abdominal:      General: Bowel sounds are normal. There is no distension.      Palpations: Abdomen is soft.      Tenderness: There is no abdominal tenderness. There is no guarding or rebound.   Musculoskeletal:         General: No swelling. Normal range of motion.      Cervical back: Neck supple. No tenderness.   Skin:     General: Skin is warm and dry.   Neurological:      General: No focal deficit present.      Mental Status: He is alert and oriented to person, place, and time.   Psychiatric:         Mood and Affect: Mood normal.         Behavior: Behavior normal.         Results Review:   I reviewed the patient's new clinical results.  I reviewed the patient's new imaging results and agree with the interpretation.      ECG 12 Lead    Date/Time: 11/19/2024 3:59 PM  Performed by: Lavon Otero MD    Authorized by: Lavon Otero MD  Comparison: not compared with previous ECG   Previous ECG: no previous ECG " available  Rhythm: sinus rhythm  Other findings comments: T wave inversion in the inferior and anterolateral leads    Clinical impression: abnormal EKG          Assessment / Plan:     1.  Coronary artery disease  --History of multivessel coronary artery disease, status post remote CABG  --Inferior STEMI in 10/24, underwent PCI to the native RCA  --No anginal symptoms  --Continue DAPT with aspirin and Brilinta  --Continue high intensity statin  --Continue metoprolol    2.  Hypertension  --BP adequately controlled    3.  Hyperlipidemia  --High intensity statin    4.  Stage II CKD  --Renal function stable      Follow Up:   Return in about 3 months (around 2/19/2025).      Thank you for allowing me to participate in the care of your patient. Please to not hesitate to contact me with additional questions or concerns.     LENIN Otero MD  Interventional Cardiology   11/19/2024  13:49 EST

## 2024-12-17 ENCOUNTER — OFFICE VISIT (OUTPATIENT)
Dept: PULMONOLOGY | Facility: CLINIC | Age: 79
End: 2024-12-17
Payer: MEDICARE

## 2024-12-17 VITALS
BODY MASS INDEX: 26.9 KG/M2 | HEART RATE: 87 BPM | OXYGEN SATURATION: 92 % | HEIGHT: 73 IN | WEIGHT: 203 LBS | DIASTOLIC BLOOD PRESSURE: 64 MMHG | RESPIRATION RATE: 16 BRPM | SYSTOLIC BLOOD PRESSURE: 126 MMHG

## 2024-12-17 DIAGNOSIS — Z87.891 PERSONAL HISTORY OF NICOTINE DEPENDENCE: ICD-10-CM

## 2024-12-17 DIAGNOSIS — G47.34 NOCTURNAL HYPOXIA: ICD-10-CM

## 2024-12-17 DIAGNOSIS — J44.9 CHRONIC OBSTRUCTIVE PULMONARY DISEASE, UNSPECIFIED COPD TYPE: Primary | ICD-10-CM

## 2024-12-17 DIAGNOSIS — R09.02 EXERCISE HYPOXEMIA: ICD-10-CM

## 2024-12-17 PROCEDURE — 99214 OFFICE O/P EST MOD 30 MIN: CPT | Performed by: INTERNAL MEDICINE

## 2024-12-17 RX ORDER — DONEPEZIL HYDROCHLORIDE 5 MG/1
TABLET, FILM COATED ORAL
COMMUNITY
Start: 2024-11-30

## 2024-12-17 NOTE — PROGRESS NOTES
"  Chief Complaint   Patient presents with    Shortness of Breath    Follow-up       Subjective   Geovanny Kong is a 79 y.o. male.   Patient was evaluated today for follow up of shortness of breath, hypoxia and COPD.     Patient says that his symptoms have been stable since the last clinic visit. he reports no recent exacerbations.      Patient is NOT using Anoro OR Stiolto regularly, as prescribed. Exercise tolerance has also remained limited.     Quit smoking more than 25 years ago.     The patient says that he is using oxygen as prescribed and feels that it appears to be helping some of his symptoms.      The following portions of the patient's history were reviewed and updated as appropriate: allergies, current medications, past family history, past medical history, past social history, and past surgical history.    Review of Systems   HENT:  Negative for sinus pressure, sneezing and sore throat.    Respiratory:  Positive for shortness of breath. Negative for cough, chest tightness and wheezing.        Objective   Visit Vitals  /64   Pulse 87   Resp 16   Ht 185.4 cm (72.99\") Comment: pt reported   Wt 92.1 kg (203 lb)   SpO2 92%   BMI 26.79 kg/m²       BMI Readings from Last 8 Encounters:   12/17/24 26.79 kg/m²   11/19/24 26.52 kg/m²   10/20/24 26.21 kg/m²   09/10/24 29.69 kg/m²   05/14/24 27.57 kg/m²   10/18/23 25.99 kg/m²   01/07/22 30.11 kg/m²   12/03/21 30.38 kg/m²       Physical Exam  Vitals reviewed.   Constitutional:       Appearance: He is well-developed.   HENT:      Head: Normocephalic and atraumatic.   Eyes:      Extraocular Movements: Extraocular movements intact.   Cardiovascular:      Rate and Rhythm: Normal rate.   Pulmonary:      Comments: Somewhat hyperresonant to percussion.  Somewhat decreased air entry.  No obvious wheezing noted.   Musculoskeletal:      Cervical back: Neck supple.   Neurological:      Mental Status: He is alert.         Assessment & Plan   Diagnoses and all orders for " this visit:    1. Chronic obstructive pulmonary disease, unspecified COPD type (Primary)    2. Personal history of nicotine dependence    3. Exercise hypoxemia    4. Nocturnal hypoxia    Other orders  -     tiotropium bromide-olodaterol (STIOLTO RESPIMAT) 2.5-2.5 MCG/ACT aerosol solution inhaler; Inhale 2 puffs Daily.  Dispense: 1 each; Refill: 5           Return in about 9 months (around 9/17/2025) for Recheck, For Keyonna Duncan).    DISCUSSION (if any):  Last CT scan results was reviewed in great detail with the patient.  Results for orders placed during the hospital encounter of 10/18/24    CT Angiogram Chest    Narrative  CT ANGIOGRAM OF THE CHEST    HISTORY: eval for dissection.    COMPARISON: None.    TECHNIQUE: Thin section axial images were obtained through the chest and  during the arterial phase of IV contrast administration. Coronal 3D MIP  reconstructed images were also provided.    FINDINGS: The pulmonary arteries are well opacified and there is no  filling defect to indicate pulmonary embolism. The thoracic aorta is  normal. There is emphysema. The lungs are clear and there is no pleural  disease, adenopathy or significant osseous abnormality.    Impression  No pulmonary embolism, aortic dissection or other  significant abnormality.          This study was performed with techniques to keep radiation doses as low  as reasonably achievable (ALARA). Individualized dose reduction  techniques using automated exposure control or adjustment of vA and/or  kV according to the patient size were employed.        CT ABDOMEN AND CT PELVIS,  CTA ABDOMEN, CTA PELVIS AND CTA LOWER  EXTREMITY RUNOFF    HISTORY: As above    COMPARISON: None    TECHNIQUE: Thin section axial CT of the abdomen, pelvis and lower  extremities with IV contrast supplemented with multiplanar  reconstruction under CT Angiogram protocol    FINDINGS:    Non-vascular: The gallbladder, solid abdominal organs and ureters are  unremarkable. A  benign-appearing cystic lesion is seen in the left  retroperitoneum posterior to the left kidney. The GI tract shows no  abnormality. The urinary bladder is normal. There is no pelvic or  abdominal ascites, adenopathy, or significant osseous abnormality.      CT ANGIOGRAM ABDOMEN AND PELVIS: The abdominal aorta is unremarkable  with no evidence of dissection. The aortic branches are unremarkable as  are the iliac arteries bilaterally.    CTA RIGHT LOWER EXTREMITY: Unremarkable.    CTA LEFT LOWER EXTREMITY: Unremarkable.    CONCLUSION: No significant abnormality.      This study was performed with techniques to keep radiation doses as low  as reasonably achievable (ALARA). Individualized dose reduction  techniques using automated exposure control or adjustment of vA and/or  kV according to the patient size were employed.    This report was signed and finalized on 10/18/2024 8:45 PM by Geronimo Morris MD.      I also reviewed his last echocardiogram and shared the results with him.   Results for orders placed during the hospital encounter of 10/18/24    Adult Transthoracic Echo Complete W/ Cont if Necessary Per Protocol    Interpretation Summary  1.  Normal left ventricular size and systolic function, LVEF 55-60.  2.  Normal LV diastolic filling pattern.  3.  Normal right ventricular size and systolic function.  4.  Normal left atrial volume index.  5.  Mild mitral regurgitation.  6.  Mild tricuspid regurgitation.      Lab Results   Component Value Date    EOSABS 0.10 10/18/2024    EOSABS 0.06 09/10/2024    EOSABS 0.09 07/21/2019    &   Lab Results   Component Value Date    CO2 22.3 10/20/2024     ============================  ============================    Last PFTs showed moderate obstruction with mild restriction.  These were performed in May 2024.    Due to symptoms which are suggestive of poorly controlled obstructive lung disease, I have advised him to use medications regularly .    Other medications will remain  the same    Compliance with medications stressed.     Side effects of prescribed medications were discussed with the patient    The patient was advised to start using oxygen with exercise, especially since his last 6MWT showed significant hypoxemia.     The patient was advised to continue using oxygen at night.    Vaccination status addressed.    Up-to-date with influenza vaccinations.     Up-to-date with pneumonia vaccinations.       Dictated utilizing Dragon dictation.    This document was electronically signed by Adis Gasca MD on 12/17/24 at 11:03 EST

## 2025-01-29 ENCOUNTER — TELEPHONE (OUTPATIENT)
Dept: CARDIOLOGY | Facility: CLINIC | Age: 80
End: 2025-01-29
Payer: MEDICARE

## 2025-01-29 DIAGNOSIS — I21.11 STEMI INVOLVING RIGHT CORONARY ARTERY: Primary | ICD-10-CM

## 2025-01-29 RX ORDER — CLOPIDOGREL BISULFATE 75 MG/1
75 TABLET ORAL DAILY
Qty: 90 TABLET | Refills: 3 | Status: SHIPPED | OUTPATIENT
Start: 2025-01-29

## 2025-01-29 NOTE — TELEPHONE ENCOUNTER
I called the number requested and she states pt wasn't with her and we needed to contact him at [phone]. I attempted to reach pt at this number om

## 2025-01-29 NOTE — TELEPHONE ENCOUNTER
Pt stopped in office, states that his Brilinta is to expensive and wants to know if he can be switched to something more affordable. Please advise.   no

## 2025-01-29 NOTE — TELEPHONE ENCOUNTER
Caller: Paradise Kong     Relationship:Emergency Contact     Callback number: [phone]    Is it ok to leave a message: [x] Yes [] No    Requested medication for samples: UNKNOWN, SEE BELOW     How much medication does the patient currently have left: PT IS OUT, UNKNOWN HOW LONG     Who will be picking up the samples: PT     Do you need information about patient financial assistance for this medication: [] Yes [x] No    Additional details provided: PT IS IN A DONUT HOLE WITH THE MEDS DR. LOVE PUT HIM ON WHEN PT HAD A HEART ATTACK. REQUESTING SAMPLES- PLEASE ADVISE.

## 2025-01-29 NOTE — TELEPHONE ENCOUNTER
Attempted to reach pt lmom, called number left in original message and advised that a script for Plavix was sent to the pharmacy for .

## 2025-02-24 ENCOUNTER — PATIENT ROUNDING (BHMG ONLY) (OUTPATIENT)
Dept: URGENT CARE | Facility: CLINIC | Age: 80
End: 2025-02-24
Payer: COMMERCIAL

## 2025-05-01 ENCOUNTER — TELEPHONE (OUTPATIENT)
Dept: CARDIOLOGY | Facility: CLINIC | Age: 80
End: 2025-05-01

## 2025-05-01 NOTE — TELEPHONE ENCOUNTER
Wife stated he is having intermittent chest pain not current pain. He has requested an appt with Dr. Otero in July. He denied seeing a NP at this time. Advised ER if new or worsening symptoms arise.

## 2025-05-01 NOTE — TELEPHONE ENCOUNTER
Caller: Paradise Kong    Relationship to patient: Emergency Contact    Best call back number: [phone]     Chief complaint: PT HAS BEEN HAVING CHEST PAINS. PLEASE ADVISE    Type of visit: F/U    Requested date: ASAP     If rescheduling, when is the original appointment: 7.18.25     Additional notes: PT ONLY WANTS TO SEE DR. LOVE.

## 2025-08-01 RX ORDER — METOPROLOL SUCCINATE 25 MG/1
25 TABLET, EXTENDED RELEASE ORAL
Qty: 90 TABLET | Refills: 0 | Status: SHIPPED | OUTPATIENT
Start: 2025-08-01

## (undated) DEVICE — DGW .035 FC J3MM 260CM TEF: Brand: EMERALD

## (undated) DEVICE — TREK CORONARY DILATATION CATHETER 2.50 MM X 12 MM / RAPID-EXCHANGE: Brand: TREK

## (undated) DEVICE — ST ACC MICROPUNCTURE STFF .018 ECHO/PLAT/TP 4F/10CM 21G/7CM

## (undated) DEVICE — GUIDE CATHETER: Brand: MACH1™

## (undated) DEVICE — CATH F6 ST JL 3.5 100CM: Brand: SUPERTORQUE

## (undated) DEVICE — GLIDESHEATH SLENDER STAINLESS STEEL KIT: Brand: GLIDESHEATH SLENDER

## (undated) DEVICE — HI-TORQUE BALANCE MIDDLEWEIGHT UNIVERSAL GUIDE WIRE .014 STRAIGHT TIP 3.0 CM X 190 CM: Brand: HI-TORQUE BALANCE MIDDLEWEIGHT UNIVERSAL

## (undated) DEVICE — COPILOT BLEEDBACK CONTROL VALVE: Brand: COPILOT

## (undated) DEVICE — NC TREK NEO™ CORONARY DILATATION CATHETER 2.50 X 12 MM / RAPID-EXCHANGE: Brand: NC TREK NEO™

## (undated) DEVICE — INFLATION DEVICE KIT: Brand: ENCORE™ 26 ADVANTAGE KIT

## (undated) DEVICE — PINNACLE INTRODUCER SHEATH: Brand: PINNACLE

## (undated) DEVICE — ANGIOGRAPHIC CATHETER: Brand: EXPO™

## (undated) DEVICE — CATH F6INF TL JR 4 100CM: Brand: INFINITI